# Patient Record
Sex: MALE | Race: WHITE | NOT HISPANIC OR LATINO | Employment: UNEMPLOYED | URBAN - METROPOLITAN AREA
[De-identification: names, ages, dates, MRNs, and addresses within clinical notes are randomized per-mention and may not be internally consistent; named-entity substitution may affect disease eponyms.]

---

## 2017-03-28 ENCOUNTER — COMMUNICATION - HEALTHEAST (OUTPATIENT)
Dept: FAMILY MEDICINE | Facility: CLINIC | Age: 62
End: 2017-03-28

## 2017-03-28 DIAGNOSIS — I10 ESSENTIAL HYPERTENSION: ICD-10-CM

## 2017-04-02 ENCOUNTER — COMMUNICATION - HEALTHEAST (OUTPATIENT)
Dept: FAMILY MEDICINE | Facility: CLINIC | Age: 62
End: 2017-04-02

## 2017-04-02 DIAGNOSIS — I10 ESSENTIAL HYPERTENSION: ICD-10-CM

## 2017-04-11 ENCOUNTER — OFFICE VISIT - HEALTHEAST (OUTPATIENT)
Dept: FAMILY MEDICINE | Facility: CLINIC | Age: 62
End: 2017-04-11

## 2017-04-11 DIAGNOSIS — S06.9X9S MAJOR NEUROCOGNITIVE DISORDER AS LATE EFFECT OF TRAUMATIC BRAIN INJURY WITH BEHAVIORAL DISTURBANCE (H): ICD-10-CM

## 2017-04-11 DIAGNOSIS — Z87.898 HX OF SYNCOPE: ICD-10-CM

## 2017-04-11 DIAGNOSIS — Z78.9 ALCOHOL USE: ICD-10-CM

## 2017-04-11 DIAGNOSIS — F33.1 MAJOR DEPRESSIVE DISORDER, RECURRENT EPISODE, MODERATE (H): ICD-10-CM

## 2017-04-11 DIAGNOSIS — B19.20 UNSPECIFIED VIRAL HEPATITIS C WITHOUT HEPATIC COMA: ICD-10-CM

## 2017-04-11 DIAGNOSIS — Z00.00 ROUTINE GENERAL MEDICAL EXAMINATION AT A HEALTH CARE FACILITY: ICD-10-CM

## 2017-04-11 DIAGNOSIS — F02.818 MAJOR NEUROCOGNITIVE DISORDER AS LATE EFFECT OF TRAUMATIC BRAIN INJURY WITH BEHAVIORAL DISTURBANCE (H): ICD-10-CM

## 2017-04-11 LAB
CHOLEST SERPL-MCNC: 254 MG/DL
FASTING STATUS PATIENT QL REPORTED: NO
HDLC SERPL-MCNC: 140 MG/DL
LDLC SERPL CALC-MCNC: 100 MG/DL
PSA SERPL-MCNC: 0.8 NG/ML (ref 0–3.5)
TRIGL SERPL-MCNC: 68 MG/DL

## 2017-04-11 ASSESSMENT — MIFFLIN-ST. JEOR: SCORE: 1712.98

## 2017-05-15 ENCOUNTER — RECORDS - HEALTHEAST (OUTPATIENT)
Dept: ADMINISTRATIVE | Facility: OTHER | Age: 62
End: 2017-05-15

## 2017-05-18 ENCOUNTER — COMMUNICATION - HEALTHEAST (OUTPATIENT)
Dept: FAMILY MEDICINE | Facility: CLINIC | Age: 62
End: 2017-05-18

## 2017-05-18 ENCOUNTER — COMMUNICATION - HEALTHEAST (OUTPATIENT)
Dept: NEUROLOGY | Facility: CLINIC | Age: 62
End: 2017-05-18

## 2017-05-18 DIAGNOSIS — I10 ESSENTIAL HYPERTENSION: ICD-10-CM

## 2017-05-18 DIAGNOSIS — F43.22 ADJUSTMENT DISORDER WITH ANXIOUS MOOD: ICD-10-CM

## 2018-06-08 ENCOUNTER — COMMUNICATION - HEALTHEAST (OUTPATIENT)
Dept: FAMILY MEDICINE | Facility: CLINIC | Age: 63
End: 2018-06-08

## 2018-06-08 DIAGNOSIS — I10 ESSENTIAL HYPERTENSION: ICD-10-CM

## 2018-06-13 ENCOUNTER — COMMUNICATION - HEALTHEAST (OUTPATIENT)
Dept: FAMILY MEDICINE | Facility: CLINIC | Age: 63
End: 2018-06-13

## 2018-06-13 DIAGNOSIS — I10 ESSENTIAL HYPERTENSION: ICD-10-CM

## 2018-06-16 ENCOUNTER — COMMUNICATION - HEALTHEAST (OUTPATIENT)
Dept: FAMILY MEDICINE | Facility: CLINIC | Age: 63
End: 2018-06-16

## 2018-06-16 DIAGNOSIS — I10 ESSENTIAL HYPERTENSION: ICD-10-CM

## 2018-11-23 ENCOUNTER — COMMUNICATION - HEALTHEAST (OUTPATIENT)
Dept: FAMILY MEDICINE | Facility: CLINIC | Age: 63
End: 2018-11-23

## 2018-11-23 DIAGNOSIS — I10 ESSENTIAL HYPERTENSION: ICD-10-CM

## 2018-11-27 ENCOUNTER — COMMUNICATION - HEALTHEAST (OUTPATIENT)
Dept: FAMILY MEDICINE | Facility: CLINIC | Age: 63
End: 2018-11-27

## 2018-11-27 DIAGNOSIS — I10 ESSENTIAL HYPERTENSION: ICD-10-CM

## 2018-12-02 ENCOUNTER — COMMUNICATION - HEALTHEAST (OUTPATIENT)
Dept: FAMILY MEDICINE | Facility: CLINIC | Age: 63
End: 2018-12-02

## 2018-12-02 DIAGNOSIS — I10 ESSENTIAL HYPERTENSION: ICD-10-CM

## 2019-01-24 ENCOUNTER — COMMUNICATION - HEALTHEAST (OUTPATIENT)
Dept: FAMILY MEDICINE | Facility: CLINIC | Age: 64
End: 2019-01-24

## 2019-02-13 ENCOUNTER — RECORDS - HEALTHEAST (OUTPATIENT)
Dept: ADMINISTRATIVE | Facility: OTHER | Age: 64
End: 2019-02-13

## 2019-02-18 ENCOUNTER — HOME CARE/HOSPICE - HEALTHEAST (OUTPATIENT)
Dept: HOME HEALTH SERVICES | Facility: HOME HEALTH | Age: 64
End: 2019-02-18

## 2019-02-21 ENCOUNTER — RECORDS - HEALTHEAST (OUTPATIENT)
Dept: ADMINISTRATIVE | Facility: OTHER | Age: 64
End: 2019-02-21

## 2019-02-27 ENCOUNTER — RECORDS - HEALTHEAST (OUTPATIENT)
Dept: LAB | Facility: CLINIC | Age: 64
End: 2019-02-27

## 2019-02-28 LAB
ALBUMIN SERPL-MCNC: 2.9 G/DL (ref 3.5–5)
ALP SERPL-CCNC: 60 U/L (ref 45–120)
ALT SERPL W P-5'-P-CCNC: 22 U/L (ref 0–45)
ANION GAP SERPL CALCULATED.3IONS-SCNC: 9 MMOL/L (ref 5–18)
AST SERPL W P-5'-P-CCNC: 24 U/L (ref 0–40)
BILIRUB DIRECT SERPL-MCNC: 0.2 MG/DL
BILIRUB SERPL-MCNC: 0.3 MG/DL (ref 0–1)
BUN SERPL-MCNC: 8 MG/DL (ref 8–22)
CALCIUM SERPL-MCNC: 9 MG/DL (ref 8.5–10.5)
CHLORIDE BLD-SCNC: 106 MMOL/L (ref 98–107)
CO2 SERPL-SCNC: 23 MMOL/L (ref 22–31)
CREAT SERPL-MCNC: 0.68 MG/DL (ref 0.7–1.3)
GFR SERPL CREATININE-BSD FRML MDRD: >60 ML/MIN/1.73M2
GLUCOSE BLD-MCNC: 86 MG/DL (ref 70–125)
POTASSIUM BLD-SCNC: 4 MMOL/L (ref 3.5–5)
PROT SERPL-MCNC: 6.9 G/DL (ref 6–8)
SODIUM SERPL-SCNC: 138 MMOL/L (ref 136–145)

## 2019-03-05 ENCOUNTER — RECORDS - HEALTHEAST (OUTPATIENT)
Dept: ADMINISTRATIVE | Facility: OTHER | Age: 64
End: 2019-03-05

## 2019-03-13 ENCOUNTER — RECORDS - HEALTHEAST (OUTPATIENT)
Dept: ADMINISTRATIVE | Facility: OTHER | Age: 64
End: 2019-03-13

## 2019-05-14 ENCOUNTER — COMMUNICATION - HEALTHEAST (OUTPATIENT)
Dept: FAMILY MEDICINE | Facility: CLINIC | Age: 64
End: 2019-05-14

## 2019-05-14 DIAGNOSIS — I10 ESSENTIAL HYPERTENSION: ICD-10-CM

## 2019-07-12 ENCOUNTER — RECORDS - HEALTHEAST (OUTPATIENT)
Dept: ADMINISTRATIVE | Facility: OTHER | Age: 64
End: 2019-07-12

## 2019-08-18 ENCOUNTER — RECORDS - HEALTHEAST (OUTPATIENT)
Dept: ADMINISTRATIVE | Facility: OTHER | Age: 64
End: 2019-08-18

## 2019-08-27 ENCOUNTER — OFFICE VISIT - HEALTHEAST (OUTPATIENT)
Dept: FAMILY MEDICINE | Facility: CLINIC | Age: 64
End: 2019-08-27

## 2019-08-27 DIAGNOSIS — F10.20 ALCOHOLISM (H): ICD-10-CM

## 2019-08-27 DIAGNOSIS — R56.9 ALCOHOL WITHDRAWAL SEIZURE WITHOUT COMPLICATION (H): ICD-10-CM

## 2019-08-27 DIAGNOSIS — S06.9X9S MAJOR NEUROCOGNITIVE DISORDER AS LATE EFFECT OF TRAUMATIC BRAIN INJURY WITH BEHAVIORAL DISTURBANCE (H): ICD-10-CM

## 2019-08-27 DIAGNOSIS — Z71.6 ENCOUNTER FOR TOBACCO USE CESSATION COUNSELING: ICD-10-CM

## 2019-08-27 DIAGNOSIS — Z09 HOSPITAL DISCHARGE FOLLOW-UP: ICD-10-CM

## 2019-08-27 DIAGNOSIS — R35.0 URINE FREQUENCY: ICD-10-CM

## 2019-08-27 DIAGNOSIS — F02.818 MAJOR NEUROCOGNITIVE DISORDER AS LATE EFFECT OF TRAUMATIC BRAIN INJURY WITH BEHAVIORAL DISTURBANCE (H): ICD-10-CM

## 2019-08-27 DIAGNOSIS — F10.930 ALCOHOL WITHDRAWAL SEIZURE WITHOUT COMPLICATION (H): ICD-10-CM

## 2019-08-27 LAB
ALBUMIN UR-MCNC: NEGATIVE MG/DL
APPEARANCE UR: CLEAR
BILIRUB UR QL STRIP: NEGATIVE
COLOR UR AUTO: YELLOW
GLUCOSE UR STRIP-MCNC: NEGATIVE MG/DL
HGB UR QL STRIP: NEGATIVE
KETONES UR STRIP-MCNC: NEGATIVE MG/DL
LEUKOCYTE ESTERASE UR QL STRIP: NEGATIVE
NITRATE UR QL: NEGATIVE
PH UR STRIP: 7 [PH] (ref 5–8)
SP GR UR STRIP: 1.01 (ref 1–1.03)
UROBILINOGEN UR STRIP-ACNC: NORMAL

## 2019-08-27 ASSESSMENT — MIFFLIN-ST. JEOR: SCORE: 1654.01

## 2019-08-28 LAB — BACTERIA SPEC CULT: NO GROWTH

## 2019-09-11 ENCOUNTER — OFFICE VISIT - HEALTHEAST (OUTPATIENT)
Dept: FAMILY MEDICINE | Facility: CLINIC | Age: 64
End: 2019-09-11

## 2019-09-11 DIAGNOSIS — F10.20 ALCOHOLISM (H): ICD-10-CM

## 2019-09-11 DIAGNOSIS — R56.9 ALCOHOL WITHDRAWAL SEIZURE WITHOUT COMPLICATION (H): ICD-10-CM

## 2019-09-11 DIAGNOSIS — F02.818 MAJOR NEUROCOGNITIVE DISORDER AS LATE EFFECT OF TRAUMATIC BRAIN INJURY WITH BEHAVIORAL DISTURBANCE (H): ICD-10-CM

## 2019-09-11 DIAGNOSIS — F10.930 ALCOHOL WITHDRAWAL SEIZURE WITHOUT COMPLICATION (H): ICD-10-CM

## 2019-09-11 DIAGNOSIS — R35.0 URINE FREQUENCY: ICD-10-CM

## 2019-09-11 DIAGNOSIS — S06.9X9S MAJOR NEUROCOGNITIVE DISORDER AS LATE EFFECT OF TRAUMATIC BRAIN INJURY WITH BEHAVIORAL DISTURBANCE (H): ICD-10-CM

## 2019-09-11 LAB — PSA SERPL-MCNC: 1 NG/ML (ref 0–4.5)

## 2019-09-11 RX ORDER — ACETAMINOPHEN 500 MG
500 TABLET ORAL
Status: SHIPPED | COMMUNITY
Start: 2019-08-18

## 2019-09-13 ENCOUNTER — COMMUNICATION - HEALTHEAST (OUTPATIENT)
Dept: FAMILY MEDICINE | Facility: CLINIC | Age: 64
End: 2019-09-13

## 2019-11-19 ENCOUNTER — COMMUNICATION - HEALTHEAST (OUTPATIENT)
Dept: FAMILY MEDICINE | Facility: CLINIC | Age: 64
End: 2019-11-19

## 2019-11-19 ENCOUNTER — OFFICE VISIT - HEALTHEAST (OUTPATIENT)
Dept: FAMILY MEDICINE | Facility: CLINIC | Age: 64
End: 2019-11-19

## 2019-11-19 DIAGNOSIS — F02.818 MAJOR NEUROCOGNITIVE DISORDER AS LATE EFFECT OF TRAUMATIC BRAIN INJURY WITH BEHAVIORAL DISTURBANCE (H): ICD-10-CM

## 2019-11-19 DIAGNOSIS — S06.9X9S MAJOR NEUROCOGNITIVE DISORDER AS LATE EFFECT OF TRAUMATIC BRAIN INJURY WITH BEHAVIORAL DISTURBANCE (H): ICD-10-CM

## 2019-11-19 DIAGNOSIS — F10.20 ALCOHOLISM (H): ICD-10-CM

## 2019-11-19 ASSESSMENT — MIFFLIN-ST. JEOR: SCORE: 1635.87

## 2019-11-19 ASSESSMENT — PATIENT HEALTH QUESTIONNAIRE - PHQ9: SUM OF ALL RESPONSES TO PHQ QUESTIONS 1-9: 1

## 2019-11-20 ENCOUNTER — COMMUNICATION - HEALTHEAST (OUTPATIENT)
Dept: SCHEDULING | Facility: CLINIC | Age: 64
End: 2019-11-20

## 2019-11-20 DIAGNOSIS — F10.20 ALCOHOLISM (H): ICD-10-CM

## 2019-11-20 DIAGNOSIS — S06.9X9S MAJOR NEUROCOGNITIVE DISORDER AS LATE EFFECT OF TRAUMATIC BRAIN INJURY WITH BEHAVIORAL DISTURBANCE (H): ICD-10-CM

## 2019-11-20 DIAGNOSIS — F02.818 MAJOR NEUROCOGNITIVE DISORDER AS LATE EFFECT OF TRAUMATIC BRAIN INJURY WITH BEHAVIORAL DISTURBANCE (H): ICD-10-CM

## 2020-03-17 ENCOUNTER — COMMUNICATION - HEALTHEAST (OUTPATIENT)
Dept: FAMILY MEDICINE | Facility: CLINIC | Age: 65
End: 2020-03-17

## 2020-03-17 DIAGNOSIS — S06.9X9S MAJOR NEUROCOGNITIVE DISORDER AS LATE EFFECT OF TRAUMATIC BRAIN INJURY WITH BEHAVIORAL DISTURBANCE (H): ICD-10-CM

## 2020-03-17 DIAGNOSIS — F02.818 MAJOR NEUROCOGNITIVE DISORDER AS LATE EFFECT OF TRAUMATIC BRAIN INJURY WITH BEHAVIORAL DISTURBANCE (H): ICD-10-CM

## 2020-03-17 DIAGNOSIS — F10.20 ALCOHOLISM (H): ICD-10-CM

## 2020-03-18 ENCOUNTER — COMMUNICATION - HEALTHEAST (OUTPATIENT)
Dept: FAMILY MEDICINE | Facility: CLINIC | Age: 65
End: 2020-03-18

## 2020-03-18 DIAGNOSIS — S06.9X9S MAJOR NEUROCOGNITIVE DISORDER AS LATE EFFECT OF TRAUMATIC BRAIN INJURY WITH BEHAVIORAL DISTURBANCE (H): ICD-10-CM

## 2020-03-18 DIAGNOSIS — F02.818 MAJOR NEUROCOGNITIVE DISORDER AS LATE EFFECT OF TRAUMATIC BRAIN INJURY WITH BEHAVIORAL DISTURBANCE (H): ICD-10-CM

## 2020-06-25 ENCOUNTER — OFFICE VISIT - HEALTHEAST (OUTPATIENT)
Dept: FAMILY MEDICINE | Facility: CLINIC | Age: 65
End: 2020-06-25

## 2020-06-25 DIAGNOSIS — F02.818 MAJOR NEUROCOGNITIVE DISORDER AS LATE EFFECT OF TRAUMATIC BRAIN INJURY WITH BEHAVIORAL DISTURBANCE (H): ICD-10-CM

## 2020-06-25 DIAGNOSIS — F10.20 ALCOHOLISM (H): ICD-10-CM

## 2020-06-25 DIAGNOSIS — S06.9X9S MAJOR NEUROCOGNITIVE DISORDER AS LATE EFFECT OF TRAUMATIC BRAIN INJURY WITH BEHAVIORAL DISTURBANCE (H): ICD-10-CM

## 2020-06-25 DIAGNOSIS — I10 ESSENTIAL HYPERTENSION: ICD-10-CM

## 2020-09-02 ENCOUNTER — RECORDS - HEALTHEAST (OUTPATIENT)
Dept: ADMINISTRATIVE | Facility: OTHER | Age: 65
End: 2020-09-02

## 2020-09-14 ENCOUNTER — OFFICE VISIT - HEALTHEAST (OUTPATIENT)
Dept: FAMILY MEDICINE | Facility: CLINIC | Age: 65
End: 2020-09-14

## 2020-09-14 ENCOUNTER — COMMUNICATION - HEALTHEAST (OUTPATIENT)
Dept: FAMILY MEDICINE | Facility: CLINIC | Age: 65
End: 2020-09-14

## 2020-09-14 DIAGNOSIS — F02.818 MAJOR NEUROCOGNITIVE DISORDER AS LATE EFFECT OF TRAUMATIC BRAIN INJURY WITH BEHAVIORAL DISTURBANCE (H): ICD-10-CM

## 2020-09-14 DIAGNOSIS — G40.909 SEIZURE DISORDER (H): ICD-10-CM

## 2020-09-14 DIAGNOSIS — S06.9X9S MAJOR NEUROCOGNITIVE DISORDER AS LATE EFFECT OF TRAUMATIC BRAIN INJURY WITH BEHAVIORAL DISTURBANCE (H): ICD-10-CM

## 2020-09-14 DIAGNOSIS — F10.20 ALCOHOLISM (H): ICD-10-CM

## 2020-09-14 DIAGNOSIS — Z09 HOSPITAL DISCHARGE FOLLOW-UP: ICD-10-CM

## 2020-09-14 DIAGNOSIS — I10 ESSENTIAL HYPERTENSION: ICD-10-CM

## 2020-09-14 DIAGNOSIS — I69.30 SEQUELA OF LACUNAR INFARCTION: ICD-10-CM

## 2020-09-21 ENCOUNTER — COMMUNICATION - HEALTHEAST (OUTPATIENT)
Dept: FAMILY MEDICINE | Facility: CLINIC | Age: 65
End: 2020-09-21

## 2020-11-02 ENCOUNTER — AMBULATORY - HEALTHEAST (OUTPATIENT)
Dept: FAMILY MEDICINE | Facility: CLINIC | Age: 65
End: 2020-11-02

## 2020-11-02 ENCOUNTER — COMMUNICATION - HEALTHEAST (OUTPATIENT)
Dept: FAMILY MEDICINE | Facility: CLINIC | Age: 65
End: 2020-11-02

## 2020-11-09 ENCOUNTER — COMMUNICATION - HEALTHEAST (OUTPATIENT)
Dept: FAMILY MEDICINE | Facility: CLINIC | Age: 65
End: 2020-11-09

## 2020-11-30 ENCOUNTER — COMMUNICATION - HEALTHEAST (OUTPATIENT)
Dept: FAMILY MEDICINE | Facility: CLINIC | Age: 65
End: 2020-11-30

## 2020-11-30 DIAGNOSIS — S06.9X9S MAJOR NEUROCOGNITIVE DISORDER AS LATE EFFECT OF TRAUMATIC BRAIN INJURY WITH BEHAVIORAL DISTURBANCE (H): ICD-10-CM

## 2020-11-30 DIAGNOSIS — F02.818 MAJOR NEUROCOGNITIVE DISORDER AS LATE EFFECT OF TRAUMATIC BRAIN INJURY WITH BEHAVIORAL DISTURBANCE (H): ICD-10-CM

## 2020-12-17 ENCOUNTER — OFFICE VISIT - HEALTHEAST (OUTPATIENT)
Dept: FAMILY MEDICINE | Facility: CLINIC | Age: 65
End: 2020-12-17

## 2020-12-17 ENCOUNTER — RECORDS - HEALTHEAST (OUTPATIENT)
Dept: GENERAL RADIOLOGY | Facility: CLINIC | Age: 65
End: 2020-12-17

## 2020-12-17 ENCOUNTER — RECORDS - HEALTHEAST (OUTPATIENT)
Dept: ADMINISTRATIVE | Facility: OTHER | Age: 65
End: 2020-12-17

## 2020-12-17 DIAGNOSIS — Z11.1 SCREENING FOR TUBERCULOSIS: ICD-10-CM

## 2020-12-17 DIAGNOSIS — F33.1 MAJOR DEPRESSIVE DISORDER, RECURRENT EPISODE, MODERATE (H): ICD-10-CM

## 2020-12-17 DIAGNOSIS — M54.2 NECK PAIN: ICD-10-CM

## 2020-12-17 DIAGNOSIS — M54.2 CERVICALGIA: ICD-10-CM

## 2020-12-17 DIAGNOSIS — Z00.00 VISIT FOR PREVENTIVE HEALTH EXAMINATION: ICD-10-CM

## 2020-12-17 DIAGNOSIS — B19.20 HEPATITIS C VIRUS INFECTION WITHOUT HEPATIC COMA, UNSPECIFIED CHRONICITY: ICD-10-CM

## 2020-12-17 DIAGNOSIS — I10 ESSENTIAL HYPERTENSION: ICD-10-CM

## 2020-12-18 ENCOUNTER — COMMUNICATION - HEALTHEAST (OUTPATIENT)
Dept: FAMILY MEDICINE | Facility: CLINIC | Age: 65
End: 2020-12-18

## 2020-12-21 ENCOUNTER — COMMUNICATION - HEALTHEAST (OUTPATIENT)
Dept: FAMILY MEDICINE | Facility: CLINIC | Age: 65
End: 2020-12-21

## 2020-12-21 DIAGNOSIS — S06.9X9S MAJOR NEUROCOGNITIVE DISORDER AS LATE EFFECT OF TRAUMATIC BRAIN INJURY WITH BEHAVIORAL DISTURBANCE (H): ICD-10-CM

## 2020-12-21 DIAGNOSIS — F02.818 MAJOR NEUROCOGNITIVE DISORDER AS LATE EFFECT OF TRAUMATIC BRAIN INJURY WITH BEHAVIORAL DISTURBANCE (H): ICD-10-CM

## 2020-12-21 LAB
HCV RNA SERPL NAA+PROBE-ACNC: NORMAL [IU]/ML
HCV RNA SERPL NAA+PROBE-LOG IU: NORMAL LOG IU/ML

## 2020-12-22 ENCOUNTER — AMBULATORY - HEALTHEAST (OUTPATIENT)
Dept: OTHER | Facility: CLINIC | Age: 65
End: 2020-12-22

## 2020-12-22 LAB
GAMMA INTERFERON BACKGROUND BLD IA-ACNC: 0.06 IU/ML
M TB IFN-G BLD-IMP: NEGATIVE
MITOGEN IGNF BCKGRD COR BLD-ACNC: -0.01 IU/ML
MITOGEN IGNF BCKGRD COR BLD-ACNC: 0 IU/ML
QTF INTERPRETATION: NORMAL
QTF MITOGEN - NIL: >10 IU/ML

## 2020-12-31 ENCOUNTER — OFFICE VISIT - HEALTHEAST (OUTPATIENT)
Dept: FAMILY MEDICINE | Facility: CLINIC | Age: 65
End: 2020-12-31

## 2020-12-31 DIAGNOSIS — G40.909 SEIZURE DISORDER (H): ICD-10-CM

## 2020-12-31 DIAGNOSIS — M54.2 NECK PAIN: ICD-10-CM

## 2020-12-31 DIAGNOSIS — I10 ESSENTIAL HYPERTENSION: ICD-10-CM

## 2020-12-31 DIAGNOSIS — S06.9X9S MAJOR NEUROCOGNITIVE DISORDER AS LATE EFFECT OF TRAUMATIC BRAIN INJURY WITH BEHAVIORAL DISTURBANCE (H): ICD-10-CM

## 2020-12-31 DIAGNOSIS — F10.20 ALCOHOLISM (H): ICD-10-CM

## 2020-12-31 DIAGNOSIS — F02.818 MAJOR NEUROCOGNITIVE DISORDER AS LATE EFFECT OF TRAUMATIC BRAIN INJURY WITH BEHAVIORAL DISTURBANCE (H): ICD-10-CM

## 2020-12-31 DIAGNOSIS — E53.8 VITAMIN B12 DEFICIENCY (NON ANEMIC): ICD-10-CM

## 2021-01-13 ENCOUNTER — COMMUNICATION - HEALTHEAST (OUTPATIENT)
Dept: FAMILY MEDICINE | Facility: CLINIC | Age: 66
End: 2021-01-13

## 2021-01-13 DIAGNOSIS — F02.818 MAJOR NEUROCOGNITIVE DISORDER AS LATE EFFECT OF TRAUMATIC BRAIN INJURY WITH BEHAVIORAL DISTURBANCE (H): ICD-10-CM

## 2021-01-13 DIAGNOSIS — S06.9X9S MAJOR NEUROCOGNITIVE DISORDER AS LATE EFFECT OF TRAUMATIC BRAIN INJURY WITH BEHAVIORAL DISTURBANCE (H): ICD-10-CM

## 2021-01-13 DIAGNOSIS — G40.909 SEIZURE DISORDER (H): ICD-10-CM

## 2021-02-11 ENCOUNTER — OFFICE VISIT - HEALTHEAST (OUTPATIENT)
Dept: FAMILY MEDICINE | Facility: CLINIC | Age: 66
End: 2021-02-11

## 2021-02-11 DIAGNOSIS — I10 ESSENTIAL HYPERTENSION: ICD-10-CM

## 2021-02-11 DIAGNOSIS — G40.909 SEIZURE DISORDER (H): ICD-10-CM

## 2021-02-11 DIAGNOSIS — F33.1 MAJOR DEPRESSIVE DISORDER, RECURRENT EPISODE, MODERATE (H): ICD-10-CM

## 2021-02-11 DIAGNOSIS — F10.20 ALCOHOLISM (H): ICD-10-CM

## 2021-02-11 DIAGNOSIS — S06.9X9S MAJOR NEUROCOGNITIVE DISORDER AS LATE EFFECT OF TRAUMATIC BRAIN INJURY WITH BEHAVIORAL DISTURBANCE (H): ICD-10-CM

## 2021-02-11 DIAGNOSIS — E53.8 VITAMIN B12 DEFICIENCY (NON ANEMIC): ICD-10-CM

## 2021-02-11 DIAGNOSIS — F02.818 MAJOR NEUROCOGNITIVE DISORDER AS LATE EFFECT OF TRAUMATIC BRAIN INJURY WITH BEHAVIORAL DISTURBANCE (H): ICD-10-CM

## 2021-03-19 ENCOUNTER — AMBULATORY - HEALTHEAST (OUTPATIENT)
Dept: NURSING | Facility: CLINIC | Age: 66
End: 2021-03-19

## 2021-03-25 ENCOUNTER — OFFICE VISIT - HEALTHEAST (OUTPATIENT)
Dept: FAMILY MEDICINE | Facility: CLINIC | Age: 66
End: 2021-03-25

## 2021-03-25 DIAGNOSIS — F10.20 ALCOHOLISM (H): ICD-10-CM

## 2021-03-25 DIAGNOSIS — E53.8 VITAMIN B12 DEFICIENCY (NON ANEMIC): ICD-10-CM

## 2021-03-25 DIAGNOSIS — F02.818 MAJOR NEUROCOGNITIVE DISORDER AS LATE EFFECT OF TRAUMATIC BRAIN INJURY WITH BEHAVIORAL DISTURBANCE (H): ICD-10-CM

## 2021-03-25 DIAGNOSIS — S06.9X9S MAJOR NEUROCOGNITIVE DISORDER AS LATE EFFECT OF TRAUMATIC BRAIN INJURY WITH BEHAVIORAL DISTURBANCE (H): ICD-10-CM

## 2021-03-25 DIAGNOSIS — F33.1 MAJOR DEPRESSIVE DISORDER, RECURRENT EPISODE, MODERATE (H): ICD-10-CM

## 2021-03-25 DIAGNOSIS — I10 ESSENTIAL HYPERTENSION: ICD-10-CM

## 2021-03-25 DIAGNOSIS — G40.909 SEIZURE DISORDER (H): ICD-10-CM

## 2021-03-25 ASSESSMENT — PATIENT HEALTH QUESTIONNAIRE - PHQ9: SUM OF ALL RESPONSES TO PHQ QUESTIONS 1-9: 0

## 2021-04-07 ENCOUNTER — COMMUNICATION - HEALTHEAST (OUTPATIENT)
Dept: FAMILY MEDICINE | Facility: CLINIC | Age: 66
End: 2021-04-07

## 2021-04-07 DIAGNOSIS — S06.9X9S MAJOR NEUROCOGNITIVE DISORDER AS LATE EFFECT OF TRAUMATIC BRAIN INJURY WITH BEHAVIORAL DISTURBANCE (H): ICD-10-CM

## 2021-04-07 DIAGNOSIS — F02.818 MAJOR NEUROCOGNITIVE DISORDER AS LATE EFFECT OF TRAUMATIC BRAIN INJURY WITH BEHAVIORAL DISTURBANCE (H): ICD-10-CM

## 2021-04-07 RX ORDER — QUETIAPINE FUMARATE 25 MG/1
TABLET, FILM COATED ORAL
Qty: 30 TABLET | Refills: 2 | Status: SHIPPED | OUTPATIENT
Start: 2021-04-07 | End: 2021-12-28

## 2021-04-09 ENCOUNTER — AMBULATORY - HEALTHEAST (OUTPATIENT)
Dept: NURSING | Facility: CLINIC | Age: 66
End: 2021-04-09

## 2021-04-12 ENCOUNTER — COMMUNICATION - HEALTHEAST (OUTPATIENT)
Dept: FAMILY MEDICINE | Facility: CLINIC | Age: 66
End: 2021-04-12

## 2021-04-12 DIAGNOSIS — G40.909 SEIZURE DISORDER (H): ICD-10-CM

## 2021-04-13 RX ORDER — LEVETIRACETAM 750 MG/1
TABLET ORAL
Qty: 180 TABLET | Refills: 3 | Status: SHIPPED | OUTPATIENT
Start: 2021-04-13 | End: 2022-04-07

## 2021-05-03 ENCOUNTER — COMMUNICATION - HEALTHEAST (OUTPATIENT)
Dept: FAMILY MEDICINE | Facility: CLINIC | Age: 66
End: 2021-05-03

## 2021-05-03 DIAGNOSIS — I10 ESSENTIAL HYPERTENSION: ICD-10-CM

## 2021-05-04 RX ORDER — LOSARTAN POTASSIUM 100 MG/1
TABLET ORAL
Qty: 30 TABLET | Refills: 2 | Status: SHIPPED | OUTPATIENT
Start: 2021-05-04 | End: 2021-07-28

## 2021-05-26 ASSESSMENT — PATIENT HEALTH QUESTIONNAIRE - PHQ9: SUM OF ALL RESPONSES TO PHQ QUESTIONS 1-9: 1

## 2021-05-27 VITALS — DIASTOLIC BLOOD PRESSURE: 88 MMHG | HEART RATE: 66 BPM | SYSTOLIC BLOOD PRESSURE: 135 MMHG

## 2021-05-27 ASSESSMENT — PATIENT HEALTH QUESTIONNAIRE - PHQ9: SUM OF ALL RESPONSES TO PHQ QUESTIONS 1-9: 0

## 2021-05-28 NOTE — TELEPHONE ENCOUNTER
RN cannot approve Refill Request    RN can NOT refill this medication PCP messaged that patient is overdue for Office Visit.       Rebeca Otero, [facility] Triage/Med Refill 5/15/2019    Requested Prescriptions   Pending Prescriptions Disp Refills     amLODIPine-benazepril (LOTREL 5-20) 5-20 mg per capsule [Pharmacy Med Name: AMLOD/BENAZEPRIL 5-20MG  CAP] 15 capsule 0     Sig: TAKE 1 CAPSULE BY MOUTH ONCE DAILY (NEED  TO  BE  SEEN  FOR  AN  OFFICE  VISIT  WITH  DR. GROSSMAN  BEFORE  MORE  REFILLS)       [facility] Inhib[facility] Refill Protocol Failed - 5/14/2019  6:31 PM        Failed - PCP or prescribing provider visit in past 12 months       Last office visit with prescriber/PCP: 3/23/2016 Janak Smith MD OR same dept: Visit date not found OR same specialty: 3/23/2016 Janak Smith MD  Last physical: 4/11/2017 Last MTM visit: Visit date not found   Next visit within 3 mo: Visit date not found  Next physical within 3 mo: Visit date not found  Prescriber OR PCP: Janak Smith MD  Last diagnosis associated with med order: 1. Essential hypertension  - amLODIPine-benazepril (LOTREL 5-20) 5-20 mg per capsule [Pharmacy Med Name: AMLOD/BENAZEPRIL 5-20MG  CAP]; TAKE 1 CAPSULE BY MOUTH ONCE DAILY (NEED  TO  BE  SEEN  FOR  AN  OFFICE  VISIT  WITH  DR. GROSSMAN  BEFORE  MORE  REFILLS)  Dispense: 15 capsule; Refill: 0    If protocol passes may refill for 12 months if within 3 months of last provider visit (or a total of 15 months).             Failed - Blood pressure filed in past 12 months     BP Readings from Last 1 Encounters:   04/11/17 138/80             Passed - Serum Potassium in past 12 months     Lab Results   Component Value Date    Potassium 4.0 02/28/2019             Passed - Serum Creatinine in past 12 months     Creatinine   Date Value Ref Range Status   02/28/2019 0.68 (L) 0.70 - 1.30 mg/dL Final

## 2021-05-30 VITALS — BODY MASS INDEX: 31.52 KG/M2 | HEIGHT: 68 IN | WEIGHT: 208 LBS

## 2021-05-31 NOTE — PATIENT INSTRUCTIONS - HE
psa level drawn  Will call with results    F/u with dr candelario in 2 weeks    If still smoking d/c the patches    Orders printed for seroquel  And naltrexone

## 2021-05-31 NOTE — PROGRESS NOTES
Subjective: Patient comes in for hospital discharge follow-up.  Patient was hospitalized from July 12 through August 18 at [facility].  He had a alcohol withdrawal seizure    He stayed in for alcohol treatment.    He has a history of anemia and thrombocytopenia.    History of traumatic brain injury from years ago.    Patient had a subdural hematoma in the past.    He continues on medications of naltrexone 50 mg daily as well as Seroquel 25 mg at bedtime.    He takes Tylenol as needed for pain    He is on some nicotine patches.  He states that he still smoking.  I told him he cannot be on the patch if he smokes.    His recheck blood pressure is 138/82 we will monitor that in the past he been on amlodipine/benazepril.  I can see him back for recheck in a couple weeks.    Patient was supposed to get a PSA level today I did check a UA UC.  He is been having some symptoms of urinary frequency and nocturia.    Patient now is staying at a sober living.  I do have the information from where he is staying, [facility] ,  is Nancy Sutherland, phone number [phone]    Fax number [phone]    Tobacco status: He  reports that he has been smoking.  He does not have any smokeless tobacco history on file.    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 30 tablet 1     QUEtiapine (SEROQUEL) 25 MG tablet Take 1 tablet (25 mg total) by mouth at bedtime. 30 tablet 1     No current facility-administered medications for this visit.        ROS:   10 point review of systems positive as outlined above otherwise negative  Objective:    /82   Pulse 84   Temp 98.4  F (36.9  C)   Resp 17   Ht 5' 8\" (1.727 m)   Wt 195 lb (88.5 kg)   BMI 29.65 kg/m    Body mass index is 29.65 kg/m .    General appearance no acute distress    Patient for the most part seems to have appropriate thought pattern.  He did not have good memory into earlier this year when he was at the hospital level.    And when I asked him what he does during the days he says he walks walks around a little bit just watches TV.    We discussed the importance of exercise.    He has not had any alcohol since before the admission to the hospital.    HEENT neck negative no adenopathy oropharynx is clear    Vital signs as outlined with recheck blood pressure 138/82    Lungs are clear no rales or rhonchi heart was regular S1-S2 rate in the 80 range.    Abdomen nontender no guarding rebound    Extremities without edema, skin was normal no rashes.    Urine was normal urine culture pending    PSA level was ordered but patient left without getting it done    Results for orders placed or performed in visit on 08/27/19   Urinalysis   Result Value Ref Range    Color, UA Yellow Colorless, Yellow, Straw, Light Yellow    Clarity, UA Clear Clear    Glucose, UA Negative Negative    Bilirubin, UA Negative Negative    Ketones, UA Negative Negative    Specific Gravity, UA 1.015 1.005 - 1.030    Blood, UA Negative Negative    pH, UA 7.0 5.0 - 8.0    Protein, UA Negative Negative mg/dL    Urobilinogen, UA 1.0 E.U./dL 0.2 E.U./dL, 1.0 E.U./dL    Nitrite, UA Negative Negative    Leukocytes, UA Negative Negative "       Assessment:  1. Hospital discharge follow-up     2. 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 Alcoholism (H)  naltrexone (DEPADE) 50 mg tablet    DISCONTINUED: naltrexone (DEPADE) 50 mg tablet    DISCONTINUED: naltrexone (DEPADE) 50 mg tablet   4. Alcohol withdrawal seizure without complication (H)     5. Urine frequency  Urinalysis    Culture, Urine    PSA, Annual Screen (Prostatic-Specific Antigen)    CANCELED: PSA, Annual Screen (Prostatic-Specific Antigen)   6. Encounter for tobacco use cessation counseling       Discharge follow-up he had an alcohol withdrawal seizure    He is in treatment now is in the hospital for over a month.    Patient has urinary frequency we will see him back for recheck a PSA to we will discuss rectal check again he did not want to get this done today.    Borderline blood pressure continue to monitor.    Tobacco abuse.  Can use the patch if he does not smoke otherwise he should use the patch.        Plan: As discussed above follow-up in a couple weeks consider Flomax.    I have not seen the patient in over 3 years.    This transcription uses voice recognition software, which may contain typographical errors.

## 2021-06-01 NOTE — TELEPHONE ENCOUNTER
[phone] phone number has been disconnected. Called [phone] phone number belongs to [facility]. No CTC sign. Are you okay if we send letter to home?

## 2021-06-01 NOTE — PROGRESS NOTES
Subjective: Patient comes in for follow-up he was here on 8/27/2019.  Continues on naltrexone and Seroquel I did refill those    He is in a group home will be there another 2-1/2 months he has a history of alcoholism he had a withdrawal seizure and was hospitalized prior to this please see discussion    Patient has had previous neurocognitive disorder from traumatic brain injury    Patient has frequent urine he had a normal blood sugar in July 2019.    I did check a PSA level today    Teeth are in poor repair and he will see a dentist.    Tobacco status: He  reports that he has been smoking cigarettes.  He has never used smokeless tobacco.    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       naltrexone (DEPADE) 50 mg tablet Take 1 tablet (50 mg total) by mouth daily. 30 tablet 3     QUEtiapine (SEROQUEL) 25 MG tablet Take 1 tablet (25 mg total) by mouth at bedtime. 30 tablet 3     No current facility-administered medications for this visit.        ROS:   Review of systems positive as outlined, 10 point, otherwise negative    Objective:    /80 (Patient Site: Right Arm, Patient Position: Sitting, Cuff Size: Adult Large)   Pulse 94   Resp 16   Wt 196 lb 12  oz (89.2 kg)   BMI 29.92 kg/m    Body mass index is 29.92 kg/m .      General appearance no acute distress, talkative    Vital signs are stable  HEENT: Oropharynx with what looks like some gum disease and missing some teeth.  Lungs clear no rales or rhonchi heart regular S1-S2    No abdominal pain guarding or rebound    Skin was normal no rashes.        Results for orders placed or performed in visit on 09/11/19   PSA, Annual Screen (Prostatic-Specific Antigen)   Result Value Ref Range    PSA 1.0 0.0 - 4.5 ng/mL       Assessment:  1. Alcoholism (H)  naltrexone (DEPADE) 50 mg tablet   2. Urine frequency  PSA, Annual Screen (Prostatic-Specific Antigen)   3. 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 Alcohol withdrawal seizure without complication (H)       History of heavy alcohol use now has been sober for a few months.    Urine frequency no diabetes normal PSA    History of traumatic brain injury as outlined above    Plan: He will be in a group home for another 2-1/2 months he states I will see him around that time for recheck    This transcription uses voice recognition software, which may contain typographical errors.

## 2021-06-01 NOTE — TELEPHONE ENCOUNTER
----- Message from Janak Smith MD sent at 9/12/2019  5:41 PM CDT -----  Please contact patient, let him know that the prostate blood test was normal, no worries

## 2021-06-03 VITALS
SYSTOLIC BLOOD PRESSURE: 124 MMHG | BODY MASS INDEX: 29.92 KG/M2 | DIASTOLIC BLOOD PRESSURE: 80 MMHG | RESPIRATION RATE: 16 BRPM | HEART RATE: 94 BPM | WEIGHT: 196.75 LBS

## 2021-06-03 VITALS — HEIGHT: 68 IN | BODY MASS INDEX: 29.55 KG/M2 | WEIGHT: 195 LBS

## 2021-06-03 NOTE — TELEPHONE ENCOUNTER
Who is calling:  Nikki  Reason for Call:  To change the pharmacy on file to [facility] on [address] in [place] - [facility] is no longer in network for their insurance. If there are any issues or disagreeing from patient, please call wife Nikki.  Date of last appointment with primary care: 9/11/19  Okay to leave a detailed message: Yes

## 2021-06-03 NOTE — TELEPHONE ENCOUNTER
Sister called requesting to have patients RXs from yesterday transferred to [facility] on [address] in [place]    She states that he is in a treatment center but took a bus to [facility] to pick them up but they aren't there.  RN noted that they were sent to [facility] so RN called [facility] and requested they transfer Naltrexone and Quetiapine to [facility] per patient request.       Jillian Chambers RN, [facility] Med Refill/Triage, 11/20/2019 2:03 PM

## 2021-06-03 NOTE — PROGRESS NOTES
ASSESSMENT/PLAN:  1. Alcoholism (H)  naltrexone (DEPADE) 50 mg tablet   2. 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  We have reviewed records available - patient is now in sober living/safe house.  He has been treated with Naltrexone and Quetiapine for several weeks - now is running out and needs refills.  This seems appropriate for treatment of this individual.  PHQ-9 Total Score: 1 (11/19/2019  3:43 PM)  We have discussed treatment plan - patient is agreeable - will continue to follow with mental health therapist as well.      Return in about 3 months (around 2/19/2020) for Recheck.      Medications Discontinued During This Encounter   Medication Reason     naltrexone (DEPADE) 50 mg tablet Reorder     QUEtiapine (SEROQUEL) 25 MG tablet Reorder     There are no Patient Instructions on file for this visit.    Chief Complaint:  Chief Complaint   Patient presents with     Hospital Visit Follow Up     need medication prescription       HPI:   Binu Pineda is a 64 y.o. 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 No pertinent surgical history.  Social History     Socioeconomic History     Marital status: Single     Spouse name: Not on file     Number of children: Not on file     Years of education: Not on file     Highest education level: Not on file   Occupational History     Not on file   Social Needs     Financial resource strain: Not on file     Food insecurity:     Worry: Not on file     Inability: Not on file     Transportation needs:     Medical: Not on file     Non-medical: Not on file   Tobacco Use     Smoking status: Current Every Day Smoker     Types: Cigarettes     Smokeless tobacco: Never Used   Substance and Sexual Activity     Alcohol use: Not on file     Drug use: Not on file     Sexual activity: Not on file   Lifestyle     Physical activity:     Days per week: Not on file     Minutes per session: Not on file     Stress: Not on file   Relationships     Social connections:     Talks on phone: Not on file     Gets together: Not on file     Attends Sikh service: Not on file     Active member of club or organization: Not on file     Attends meetings of clubs or organizations: Not on file     Relationship status: Not on file     Intimate partner violence:     Fear of current or ex partner: Not on file     Emotionally abused: Not on file     Physically abused: Not on file     Forced sexual activity: Not on file   Other Topics Concern     Not on file   Social History Narrative     Not on file     History reviewed. No pertinent family history.    Meds:    Current Outpatient Medications:      acetaminophen (TYLENOL) 500 MG tablet, Take 500 mg by mouth., Disp: , Rfl:      naltrexone (DEPADE) 50 mg tablet, Take 1 tablet (50 mg total) by mouth daily., Disp: 30 tablet, Rfl: 3     QUEtiapine  "(SEROQUEL) 25 MG tablet, Take 1 tablet (25 mg total) by mouth at bedtime., Disp: 30 tablet, Rfl: 3    Allergies:  No Known Allergies    ROS:  Pertinent positives as noted in HPI; otherwise 12 point ROS negative.      Physical Exam:  EXAM:  /74 (Patient Site: Left Arm, Patient Position: Sitting, Cuff Size: Adult Regular)   Pulse 88   Temp 98.1  F (36.7  C) (Oral)   Ht 5' 8\" (1.727 m)   Wt 191 lb (86.6 kg)   BMI 29.04 kg/m     Gen:  NAD, appears well, well-hydrated, vague historian  HEENT:  TMs nl, oropharynx benign, nasal mucosa nl, conjunctiva clear  Neck:  Supple, no adenopathy, no thyromegaly, no carotid bruits, no JVD  Lungs:  Clear to auscultation bilaterally  Cor:  RRR no murmur  Abd:  Soft, nontender, BS+, no masses, no guarding or rebound, no HSM  Extr:  Neg.  Neuro:  No asymmetry  Skin:  Warm/dry        Results:  Results for orders placed or performed in visit on 09/11/19   PSA, Annual Screen (Prostatic-Specific Antigen)   Result Value Ref Range    PSA 1.0 0.0 - 4.5 ng/mL             "

## 2021-06-03 NOTE — TELEPHONE ENCOUNTER
Change the Pharmacy to [facility] on [address] in [place]

## 2021-06-04 VITALS
TEMPERATURE: 98.1 F | DIASTOLIC BLOOD PRESSURE: 74 MMHG | BODY MASS INDEX: 28.95 KG/M2 | HEART RATE: 88 BPM | HEIGHT: 68 IN | WEIGHT: 191 LBS | SYSTOLIC BLOOD PRESSURE: 136 MMHG

## 2021-06-05 ENCOUNTER — RECORDS - HEALTHEAST (OUTPATIENT)
Dept: FAMILY MEDICINE | Facility: CLINIC | Age: 66
End: 2021-06-05

## 2021-06-05 VITALS
SYSTOLIC BLOOD PRESSURE: 175 MMHG | WEIGHT: 206 LBS | BODY MASS INDEX: 31.32 KG/M2 | DIASTOLIC BLOOD PRESSURE: 80 MMHG | RESPIRATION RATE: 16 BRPM | OXYGEN SATURATION: 99 % | HEART RATE: 64 BPM

## 2021-06-05 DIAGNOSIS — S06.9XAA: ICD-10-CM

## 2021-06-06 NOTE — TELEPHONE ENCOUNTER
Per patient order   Want a new medication for snoring and muscle relaxer that he got from [facility].       Please advise

## 2021-06-06 NOTE — TELEPHONE ENCOUNTER
Called and left a detail msg that he does not need a telephone visit per Dr. Smith.    Med has be refill.

## 2021-06-06 NOTE — TELEPHONE ENCOUNTER
Have him find out what the medication from [facility] was.    There is no medication for snoring.  He would need to see a ear nose and throat doctor.  I would advise that at this time because of the coronavirus

## 2021-06-06 NOTE — TELEPHONE ENCOUNTER
PATIENT NEEDS QUERIQPINE AND NALTREXONE REFILLED .  ALSO A NEW MEDICATION FOR SNORING AND A MUSCLE RELAXER THAT HE GOT FROM [facility] . IF DR CAN REFILL THIS HE COULD CANCEL THE TELEPHONE VISIT CAN THIS BE DONE???

## 2021-06-09 NOTE — PROGRESS NOTES
"Binu Pineda is a 65 y.o. male who is being evaluated via a billable telephone visit.      The patient has been notified of following:     \"This telephone visit will be conducted via a call between you and your physician/provider. We have found that certain health care needs can be provided without the need for a physical exam.  This service lets us provide the care you need with a short phone conversation.  If a prescription is necessary we can send it directly to your pharmacy.  If lab work is needed we can place an order for that and you can then stop by our lab to have the test done at a later time.    Telephone visits are billed at different rates depending on your insurance coverage. During this emergency period, for some insurers they may be billed the same as an in-person visit.  Please reach out to your insurance provider with any questions.    If during the course of the call the physician/provider feels a telephone visit is not appropriate, you will not be charged for this service.\"    Patient has given verbal consent to a Telephone visit? Yes    What phone number would you like to be contacted at? [phone]         Additional provider notes:     Subjective: This patient had a virtual visit, telephone, due to the coronavirus pandemic.    Patient at evaluation back in September he is on naltrexone as well as Seroquel    Is on some Seroquel at bedtime he has had neurocognitive disorder secondary traumatic brain injury.    He actually is doing quite well now that he is quit drinking.  He is presently in a sober house.  He goes to AA meetings regularly.    Last alcohol drink was on 7/11/2019.    Patient has a history of some high blood pressure but most recently normal he is not on any medication we will continue to monitor that.    Patient needed a refill on his naltrexone and Seroquel.    This was done.    We talked about a physical exam in the next 6 months.        Tobacco status: He  reports that he " has been smoking cigarettes. He has never used smokeless tobacco.    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       naltrexone (DEPADE) 50 mg tablet Take 1 tablet (50 mg total) by mouth daily. 30 tablet 5     QUEtiapine (SEROQUEL) 25 MG tablet Take 1 tablet (25 mg total) by mouth at bedtime. 30 tablet 5     No current facility-administered medications for this visit.        ROS:   10 point review of systems positive as outlined above otherwise negative    Objective:    There were no vitals taken for this visit.  There is no height or weight on file to calculate BMI.      This was a virtual telephone exam there is no visual exam.    Patient denies any swollen glands or sore throat or nasal drainage.    Denies any headaches.    Lungs: Nonlabored breathing when he talks.  No wheezing by history    Heart: Normal history of any palpitations or rapid heartbeat.    He denies any abdominal pain    Denies any extremity swelling.    No rashes.    Results for orders placed or performed in visit on 09/11/19   PSA, Annual Screen (Prostatic-Specific Antigen)   Result Value Ref Range    PSA 1.0 0.0 - 4.5 ng/mL       Assessment:  1. Major neurocognitive disorder as late  effect of traumatic brain injury with behavioral disturbance (H)  QUEtiapine (SEROQUEL) 25 MG tablet   2. Alcoholism (H)  naltrexone (DEPADE) 50 mg tablet   3. Essential hypertension       History of major neurocognitive disorder secondary to his traumatic brain injury.    Behaviorally he is stable.  Continue Seroquel low-dose.    He seems to be doing much better now that he is quit drinking he has been dry for about 1 year.    Continue naltrexone    History of hypertension in the past presently stable off medications monitor        Plan: Plan for physical in 6 months    This transcription uses voice recognition software, which may contain typographical errors.      Phone call duration:  11 minutes, from 10:42 AM through 10:53 AM    Janak Smith MD
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
## 2021-06-11 NOTE — TELEPHONE ENCOUNTER
Left message #2 at [phone]. Sending letter out and postponing task out to 2 weeks and will try again if an appointment hasn't been made.
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
## 2021-06-11 NOTE — TELEPHONE ENCOUNTER
Left message for the pt to call back #1. Ok to relay message upon returned call.    Per Dr. Smith asked to call the pt to schedule a VV appt with him for a  follow up in 6 weeks.    Please try again later. Thank you.

## 2021-06-12 NOTE — TELEPHONE ENCOUNTER
Called and left message#1 for patient to return call to clinic to schedule a virtual visit for completion of form received by Dr. Smith. Form is at Dr. Smith's outbox.

## 2021-06-12 NOTE — TELEPHONE ENCOUNTER
Left message #2 at [phone]. Sending letter out and postponing task out to 2 weeks and will try again if an appointment hasn't been made.

## 2021-06-13 NOTE — TELEPHONE ENCOUNTER
Left message #3 at [phone]. We have made several attempts to contact patient by phone and letter to schedule an appointment. Unfortunately, our calls have not been returned and we were unable to schedule. At this time, we will no longer make an attempt to schedule this appointment. Completing task.
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
## 2021-06-13 NOTE — TELEPHONE ENCOUNTER
RN cannot approve Refill Request    RN can NOT refill this medication med is not covered by policy/route to provider. Last office visit: 9/11/2019 Janak Smith MD Last Physical: 4/11/2017 Last MTM visit: Visit date not found Last visit same specialty: 11/19/2019 Gayla Cruz MD.  Next visit within 3 mo: Visit date not found  Next physical within 3 mo: Visit date not found      Rebeca Otero, [facility] [place] Connection Triage/Med Refill 12/1/2020    Requested Prescriptions   Pending Prescriptions Disp Refills     QUEtiapine (SEROQUEL) 25 MG tablet [Pharmacy Med Name: QUETIAPINE FUMARATE 25MG TAB] 30 tablet 5     Sig: TAKE 1 TABLET BY MOUTH EVERY NIGHT AT BEDTIME       There is no refill protocol information for this order

## 2021-06-14 NOTE — PROGRESS NOTES
"Binu Pineda is a 65 y.o. male who is being evaluated via a billable video visit.      The patient has been notified of following:     \"This video visit will be conducted via a call between you and your physician/provider. We have found that certain health care needs can be provided without the need for an in-person physical exam.  This service lets us provide the care you need with a video conversation.  If a prescription is necessary we can send it directly to your pharmacy.  If lab work is needed we can place an order for that and you can then stop by our lab to have the test done at a later time.    Video visits are billed at different rates depending on your insurance coverage. Please reach out to your insurance provider with any questions.    If during the course of the call the physician/provider feels a video visit is not appropriate, you will not be charged for this service.\"    Patient has given verbal consent to a Video visit? Yes  How would you like to obtain your AVS? AVS Preference: Mail a copy.  If dropped by the video visit, the video invitation should be sent to: Text to cell phone: [phone]  Will anyone else be joining your video visit? No        Video Start Time: 10;34 am    Additional provider notes:     Subjective: This patient had a virtual visit.  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  Also has had seizures.  He does see neurology, Dr. Gely Galdamez.    I reviewed evaluation from this fall.    Patient did see Dr. Juan emery on 12/17/2020 additionally reviewed that    Patient had some x-rays done regarding his neck pain there was no fracture he had had a fall.    He is now 18 months sober.    Blood pressures been elevated various times in the last month at the group home 130-160/80-99.    Today was a little better at 135/88 pulse is in the 60s.    After discussion elected to place the patient on losartan 50 mg 1 a day    He needs follow-up in 6 " weeks we will recheck blood pressure check B12 level check creatinine potassium    Labs from 11/27/2020 showed normal Keppra level B1 level normal CBC normal BMP normal    The B12 level was a slightly at the low end of normal at 236 patient is now on B12 thousand daily.    No additional concern or issue    Patient did have a hepatitis C which was negative and a QuantiFERON gold which was negative with the last labs in December    Tobacco status: He  reports that he has been smoking cigarettes. He has never used smokeless tobacco.    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       aspirin 81 MG EC tablet Take 1 tablet (81 mg total) by mouth daily. 90 tablet 3     cyclobenzaprine (FLEXERIL) 5 MG tablet Take 1 tablet (5 mg total) by mouth every 8 (eight) hours as needed for muscle spasms. 20 tablet 1     levETIRAcetam (KEPPRA) 750 MG tablet Take 1 tablet (750 mg total) by mouth 2 (two) times a day. 60 tablet 1     naltrexone (DEPADE) 50 mg tablet Take 1 tablet (50 mg total) by mouth daily. 30 tablet 5     QUEtiapine (SEROQUEL) 25 MG tablet TAKE 1 TABLET BY MOUTH EVERY NIGHT AT BEDTIME 30 tablet 0     losartan (COZAAR) 50 MG tablet Take 1  tablet (50 mg total) by mouth daily. 90 tablet 0     No current facility-administered medications for this visit.        ROS:   10 point review of systems positive as outlined above otherwise negative.  Patient denies any COVID-19 symptoms or exposure    Objective:    /88 Comment: nurse reported  Pulse 66   There is no height or weight on file to calculate BMI.      General appearance no acute distress    HEENT: No scleral icterus denies any sore throat or neck pain anteriorly does have some posterior achiness from the fall but that is improved no radicular findings    X-ray was reviewed regarding the degenerative changes in cervical spine but no fractures.    Skin is normal no rashes    Lungs: Nonlabored breathing no wheezing.    Heart: No palpitations or rapid heart rate     he denies any focal weakness or numbness    No recent seizures    He denies any real change in cognitive/memory issues.    Results for orders placed or performed in visit on 12/17/20   QTF-Mycobacterium tuberculosis by QuantiFERON-TB Gold Plus   Result Value Ref Range    QTF RESULT Negative Negative    QTF INTREPRETATION       No interferon-gamma response to M. tuberculosis antigens was detected.  Infecton with M. tuberculosis is unlikely.  A negative result alone does not exclude infection with M. tuberculosis    QTF NIL 0.06 IU/mL    QTF ANTIGEN TB1-NIL -0.01 IU/mL    QTF ANTIGEN TB2 - NIL 0.00 IU/mL    QTF MITOGEN-NIL >10.00 IU/mL   Hepatitis C RNA Quantitation by RT-PCR   Result Value Ref Range    HCV RNA Quant HCV RNA Not Detected HCVND [IU]/mL    Log of HCV RNA Qt Not Calculated <1.2 Log IU/mL       Assessment:  1. Seizure disorder (H)     2. Essential hypertension  losartan (COZAAR) 50 MG tablet    DISCONTINUED: losartan (COZAAR) 50 MG tablet   3. 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 Alcoholism (H)     5. Vitamin B12 deficiency (non anemic)     6. Neck pain        Hypertension, will start losartan 50 mg a day.    B12 deficiency, stay on B12 1000 daily    Traumatic brain injury with neurocognitive disorder and seizure history presently stable continue Keppra    Alcoholism is now been sober for 18 months.    Recent fall with x-rays and evaluation clinically improved x-rays show some arthritis through the neck    Plan: As outlined above.  Start the losartan check creatinine potassium in 6 weeks    We will also check blood pressure and check B12 level.  He will have a virtual visit prior to    This transcription uses voice recognition software, which may contain typographical errors.      Video-Visit Details    Type of service:  Video Visit    Video End Time (time video stopped): 10:50 AM  Originating Location (pt. Location): Home    Distant Location (provider location):  [facility]     Platform used for Video Visit: Avis Smith MD

## 2021-06-15 NOTE — PROGRESS NOTES
Binu Pineda is a 65 y.o. male who is being evaluated via a billable video visit.      How would you like to obtain your AVS? Mail a copy.  If dropped from the video visit, the video invitation should be resent by: Text to cell phone: [phone]   Will anyone else be joining your video visit? No      Video Start Time: 10:49 AM    Assessment & Plan     Binu was seen today for follow-up.    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  Will increase to 100 mg daily recheck in 8 weeks.  Check labs after with B12 creatinine potassium.    Seizure disorder stable on Keppra    Major depression just takes Seroquel at bedtime seems to be stable    B12 deficiency on 1000 mcg daily check B12 level in a couple months.    Alcoholism no longer on naltrexone has been sober for 21 months.    Major neurocognitive disorder relatively stable, had previous traumatic brain injury.  Continues to see neurology.    Neck strain resolved          Review of external notes as documented in note, neurology consult from November 2020 reviewed    Independent review of unique test: Lab work from 11/27/2020 from [facility]    Independent historian: Caregiver at group home      25 minutes spent on the date of the encounter doing chart review, history and exam, documentation and further activities as noted above  :[id number]}     Tobacco Cessation:   reports that he has been smoking cigarettes. He has never used smokeless tobacco.    No follow-ups on file.    Janak Smith MD  [facility]    Subjective   Binu Pineda is 65 y.o. and presents today for the following health issues   HPI   This patient  had a virtual visit, video, due to the coronavirus pandemic.    This patient has history of hypertension.  He got started on losartan 50 mg a day his pressures mainly in the 130s but occasionally will go into the 140s today.  I increased his losartan from 50 up to 100 mg/day    Patient sees neurology.  I reviewed neurology notes from 11/19/2020 from [facility].  Also reviewed labs from 11/27/2020 CBC was normal but B12 had increased from 236 up to 300s we will recheck that down the line    BMP was normal Keppra was 9.8    Has had no seizures.    Patient has a history of alcoholism he is no longer on naltrexone he has been sober for about 21 months.    Continue Seroquel at bedtime.  He is on an aspirin Keppra Seroquel.    No additional concerns right now he was present as well as a group home care attendant.  He has neurocognitive disorder secondary to traumatic brain injury.    His depression has been stable.  He just takes the Seroquel at bedtime    No behavioral issues no hallucinations.    Patient had neck strain symptoms and was on Flexeril he is now done with that as well.    He denies any COVID-19 symptoms or exposures.        Review of Systems  10 point review of systems positive as outlined above otherwise negative      Objective       Vitals:  No vitals were obtained today due to virtual visit.    Physical Exam  General appearance no acute distress    HEENT no nasal drainage no cough no sore throat    No focal weakness or numbness.    Lungs: Nonlabored breathing no wheezing.    Heart: No palpitations or rapid heart rate    Extremities without edema.    Skin was normal no rashes    We will follow up again in 6 to 8 weeks following that we will check blood pressure, labs with B12 level creatinine and potassium        Video-Visit Details    Type of service:  Video Visit    Video End Time (time video stopped): 11:05 AM  Originating Location (pt. Location): Home    Distant Location (provider location):    [facility]     Platform used for Video Visit: Cooper

## 2021-06-16 PROBLEM — G40.909 SEIZURE DISORDER (H): Status: ACTIVE | Noted: 2020-12-31

## 2021-06-16 PROBLEM — F02.818 MAJOR NEUROCOGNITIVE DISORDER AS LATE EFFECT OF TRAUMATIC BRAIN INJURY WITH BEHAVIORAL DISTURBANCE (H): Status: ACTIVE | Noted: 2021-02-11

## 2021-06-16 PROBLEM — F10.20 ALCOHOLISM (H): Status: ACTIVE | Noted: 2019-08-27

## 2021-06-16 PROBLEM — S06.9X9S MAJOR NEUROCOGNITIVE DISORDER AS LATE EFFECT OF TRAUMATIC BRAIN INJURY WITH BEHAVIORAL DISTURBANCE (H): Status: ACTIVE | Noted: 2021-02-11

## 2021-06-16 PROBLEM — E53.8 VITAMIN B12 DEFICIENCY (NON ANEMIC): Status: ACTIVE | Noted: 2020-12-31

## 2021-06-16 NOTE — TELEPHONE ENCOUNTER
Refill Approved    Rx renewed per Medication Renewal Policy. Medication was last renewed on 1/13/2021 with 2 refills.  Last office visit: 3/25/2021 Virtual visit with PCP Dr DIONI Smith .    Marline Gutiérrez, [facility] Triage/Med Refill 4/13/2021     Requested Prescriptions   Pending Prescriptions Disp Refills     levETIRAcetam (KEPPRA) 750 MG tablet [Pharmacy Med Name: levETIRAcetam 750MG TABS] 60 tablet 2     Sig: TAKE 1 TABLET BY MOUTH TWICE A DAY       Gabapentin/Levetiracetam/Tiagabine Refill Protocol  Passed - 4/12/2021 11:05 AM        Passed - PCP or prescribing provider visit in past 12 months or next 3 months     Last office visit with prescriber/PCP: 9/11/2019 Janak Smith MD OR same dept: 12/17/2020 Trisha Mina MD OR same specialty: 12/17/2020 Trisha Mina MD  Last physical: 4/11/2017 Last MTM visit: Visit date not found   Next visit within 3 mo: Visit date not found  Next physical within 3 mo: Visit date not found  Prescriber OR PCP: Janak Smith MD  Last diagnosis associated with med order: 1. Seizure disorder (H)  - levETIRAcetam (KEPPRA) 750 MG tablet [Pharmacy Med Name: levETIRAcetam 750MG TABS]; TAKE 1 TABLET BY MOUTH TWICE A DAY  Dispense: 60 tablet; Refill: 2    If protocol passes may refill for 12 months if within 3 months of last provider visit (or a total of 15 months).

## 2021-06-16 NOTE — PROGRESS NOTES
Biun Pineda is a 65 y.o. male who is being evaluated via a billable video visit.      How would you like to obtain your AVS? Mail a copy.  If dropped from the video visit, the video invitation should be resent by: Text to cell phone: [phone]   Will anyone else be joining your video visit? No      Video Start Time: 10:20 am    Assessment & Plan     Binu was seen today for follow-up.    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  He has been relatively stable since he quit drinking alcohol now almost 2 years.  Continues in a group home    He continues on B12 we will check a B12 level and a CBC level.    He is on Keppra for seizure precautions and will check a Keppra level when he comes in as well.    I reviewed his cares with the caregiver from the group home.    We will plan to check blood pressure when he comes in.  He is on losartan daily.    Plan to see him for a physical in about 3 months this summer.    He will be contacted with results of his blood tests    He will get his second Covid shot on April 9          I reviewed the neurology consult from 11/19/2020.  Review of external notes as documented in note  Review of the result(s) of each unique test - Review of lab results including Keppra level from November, also B12 which was on the low end of normal  Assessment requiring an independent historian(s) - senior care staff  Diagnosis or treatment significantly limited by social determinants of health - Low socioeconomic status      20 minutes  spent on the date of the encounter doing chart review, history and exam, documentation and further activities as noted above       Tobacco Cessation:   reports that he has been smoking cigarettes. He has never used smokeless tobacco.    Return in about 3 months (around 6/25/2021) for Annual physical.    Janak Smith MD  [facility]    Subjective   Binu Pineda is 65 y.o. and presents today for the following health issues   HPI     This patient had a virtual visit, video, due to the coronavirus pandemic.    Patient has underlying traumatic brain injury he has seizure risk.  He has had no seizures he is on Keppra.  He last saw a neurologist 11/19/2020 , from [facility].    Patient takes Keppra has not had any seizures    Also has B12 deficiency and is on supplementation    We will check Keppra CBC and a B12 level.    There was independent history from caregiver at the group home patient is doing well he is not drinking    He has not drank now for almost 2 years.    He is off naltrexone.    He feels like his depression symptoms are minimal he continues on Seroquel at bedtime as his only medicine    Her blood pressure is on losartan 100 mg a day will check blood pressure as well as the labs when he comes in    We will plan to see him back for a physical face-to-face this summer.    No COVID-19 symptoms or exposure.    He is exercising regularly    He has had his first Covid vaccination last week second Covid vaccination on 4/9/2021    Review of Systems  10 point review of systems positive as outlined above otherwise negative      Objective       Vitals:  No vitals were obtained today due to virtual visit.    Physical Exam  General appearance no acute distress    HEENT no nasal congestion or sore throat or cough no neck pain    Lungs: Nonlabored breathing no wheezing.    Heart: No palpitations or rapid heart rate    Extremities no edema.    He has normal  sensation in through his feet.    Abdomen nontender normal bowels.            Video-Visit Details    Type of service:  Video Visit    Video End Time (time video stopped): 10:32 AM  Originating Location (pt. Location): Home    Distant Location (provider location):  [facility]     Platform used for Video Visit: Cooper

## 2021-06-16 NOTE — TELEPHONE ENCOUNTER
RN cannot approve Refill Request    RN can NOT refill this medication med is not covered by policy/route to provider. Last office visit: 9/11/2019 Janak Smith MD Last Physical: 4/11/2017 Last MTM visit: Visit date not found Last visit same specialty: 12/17/2020 Trisha Mina MD.  Next visit within 3 mo: Visit date not found  Next physical within 3 mo: Visit date not found      Ghislaine Sibley, [facility] Triage/Med Refill 4/7/2021    Requested Prescriptions   Pending Prescriptions Disp Refills     QUEtiapine (SEROQUEL) 25 MG tablet [Pharmacy Med Name: QUEtiapine Fumarate 25MG TABS] 30 tablet 2     Sig: TAKE 1 TABLET BY MOUTH EVERY NIGHT AT BEDTIME       There is no refill protocol information for this order

## 2021-06-17 NOTE — TELEPHONE ENCOUNTER
RN cannot approve Refill Request    RN can NOT refill this medication PCP messaged that patient is overdue for Labs. Last office visit: 9/11/2019 Janak Smith MD Last Physical: 4/11/2017 Last MTM visit: Visit date not found Last visit same specialty: 12/17/2020 Trisha Mina MD.  Next visit within 3 mo: Visit date not found  Next physical within 3 mo: Visit date not found      Hattie Aguayo, [facility] Triage/Med Refill 5/3/2021    Requested Prescriptions   Pending Prescriptions Disp Refills     losartan (COZAAR) 100 MG tablet [Pharmacy Med Name: Losartan Potassium 100MG TABS] 30 tablet 2     Sig: TAKE 1 TABLET BY MOUTH DAILY       Angiotensin Receptor Blocker Protocol Failed - 5/3/2021 12:38 PM        Failed - Serum potassium within the past 12 months     No results found for: LN-POTASSIUM          Failed - Serum creatinine within the past 12 months     Creatinine   Date Value Ref Range Status   02/28/2019 0.68 (L) 0.70 - 1.30 mg/dL Final             Passed - PCP or prescribing provider visit in past 12 months       Last office visit with prescriber/PCP: 9/11/2019 Janak Smith MD OR same dept: 12/17/2020 Trisha Mina MD OR same specialty: 12/17/2020 Trisha Mina MD  Last physical: 4/11/2017 Last MTM visit: Visit date not found   Next visit within 3 mo: Visit date not found  Next physical within 3 mo: Visit date not found  Prescriber OR PCP: Janak Smith MD  Last diagnosis associated with med order: 1. Essential hypertension  - losartan (COZAAR) 100 MG tablet [Pharmacy Med Name: Losartan Potassium 100MG TABS]; TAKE 1 TABLET BY MOUTH DAILY  Dispense: 30 tablet; Refill: 2    If protocol passes may refill for 12 months if within 3 months of last provider visit (or a total of 15 months).             Passed - Blood pressure filed in past 12 months     BP Readings from Last 1 Encounters:   12/31/20 135/88

## 2021-06-19 NOTE — LETTER
Letter by Janak Smith MD at      Author: Janak Smith MD Service: -- Author Type: --    Filed:  Encounter Date: 9/13/2019 Status: (Other)         Binu Pineda  [address] [facility] [address]             September 16, 2019        Dear Mr. Pineda,    Below are the results from your recent visit:    Resulted Orders   PSA, Annual Screen (Prostatic-Specific Antigen)   Result Value Ref Range    PSA 1.0 0.0 - 4.5 ng/mL    Narrative    Method is Abbott Prostate-Specific Antigen (PSA)  Standard-WHO 1st International (90:10)      Your prostate blood test was normal, no worries.       Please call with questions or contact us using [employer].    Sincerely,        Electronically signed by Janak Smith MD

## 2021-06-20 NOTE — LETTER
Letter by Janak Smith MD at      Author: Janak Smith MD Service: -- Author Type: --    Filed:  Encounter Date: 3/17/2020 Status: (Other)         Binu Pineda  [address] [place] [address]             March 23, 2020        Dear Mr. Pineda,    Do you know what the medication from [facility] was?    There is no medication for snoring. You would need to see a ear, nose, and throat doctor.   I would advise that at this time because of the coronavirus.        Please call with questions or contact us using [employer].    Sincerely,        Electronically signed by Janak Smith MD

## 2021-06-20 NOTE — LETTER
Letter by Janak Smith MD at      Author: Janak Smith MD Service: -- Author Type: --    Filed:  Encounter Date: 9/21/2020 Status: (Other)         Binu Pineda  [address] [place] [address]      September 21, 2020      Dear Binu,    As a valued [facility] patient, your healthcare needs are our priority.  Your health care team has determined that you are due for an appointment regarding your 6 week follow up.    To help prevent delays in your care, please call the [facility] at [phone].    We look forward to partnering with you to achieve optimal health and wellbeing.    Sincerely,  Your care team at [facility]

## 2021-06-20 NOTE — LETTER
Letter by Janak Smith MD at      Author: Janak Smith MD Service: -- Author Type: --    Filed:  Encounter Date: 3/17/2020 Status: (Other)                                           March 23, 2020    Patient: Binu Pineda   MR Number: [id number]   YOB: 1955   Date of Visit: 3/17/2020     Dear Dr. Trevino Recipients:    Thank you for referring Binu Pineda to me for evaluation. Below are the relevant portions of my assessment and plan of care.    If you have questions, please do not hesitate to call me. I look forward to following Binu along with you.    Sincerely,        Janak Smith MD          CC  No Recipients  Janak Smith MD  3/23/2020 11:21 AM  Signed  Please send out a letter    Janak Smith MD  3/17/2020  2:07 PM  Signed  Have him find out what the medication from [facility] was.    There is no medication for snoring.  He would need to see a ear nose and throat doctor.  I would advise that at this time because of the coronavirus    Shannon Wright  3/17/2020 12:56 PM  Signed  PATIENT NEEDS QUERIQPINE AND NALTREXONE REFILLED .  ALSO A NEW MEDICATION FOR SNORING AND A MUSCLE RELAXER THAT HE GOT FROM [facility] . IF  CAN REFILL THIS HE COULD CANCEL THE TELEPHONE VISIT CAN THIS BE DONE???

## 2021-06-21 NOTE — LETTER
Letter by Trisha Mina MD at      Author: Trisha Mina MD Service: -- Author Type: --    Filed:  Encounter Date: 12/18/2020 Status: (Other)         Binu Pineda  [address]  [place] [address]             December 18, 2020         Dear Mr. Pineda,    Below are the results from your recent visit:    Resulted Orders   XR Cervical Spine 2 - 3 VWS    Narrative    EXAM: XR CERVICAL SPINE 2 - 3 VWS  LOCATION: [facility]  DATE/TIME: 12/17/2020 2:35 PM    INDICATION: Neck Pain.  COMPARISON: CT thoracic spine 04/28/2019.      Impression    Four views of the cervical spine obtained including frontal, lateral, open-mouth odontoid, and a swimmer's view. Despite submission of a swimmer's view, visualization is only possible through the inferior C6 level. There is anatomic   alignment. Vertebral body height is preserved from the craniocervical junction through the inferior C6 level. No fracture. The dens appears unremarkable.    Mild loss of disc height C2-C3 with mild to moderate loss of disc height C3-C4, C4-C5. Moderate loss of disc height C5-C6. Anterior marginal osteophytes C3, C4, C5 and C6 vertebral bodies. Prevertebral soft tissues within normal limits. Articular pillar   hypertrophic formation noted.         X-ray did confirm multilevel degenerative joint disease, if your symptoms persist I could have you see a specialist at the spine clinic    Please call with questions or contact us using [employer].    Sincerely,        Electronically signed by Trisha Mina MD        36.3

## 2021-06-21 NOTE — LETTER
Letter by Janak Smith MD at      Author: Janak Smith MD Service: -- Author Type: --    Filed:  Encounter Date: 11/9/2020 Status: (Other)         Binu Pineda  [address] [place] [address]      November 9, 2020      Dear Binu,    As a valued [facility] patient, your healthcare needs are our priority.  Your health care team has determined that you are due for an virtual visit appointment with your provider.    To help prevent delays in your care, please call the [facility] at [phone].    We look forward to partnering with you to achieve optimal health and wellbeing.    Sincerely,  Your care team at [facility] and [facility]

## 2021-06-21 NOTE — LETTER
Letter by Trisha Mina MD at      Author: Trisha Mina MD Service: -- Author Type: --    Filed:  Encounter Date: 12/21/2020 Status: (Other)         Binu Pineda  [address] [place] [address]             December 21, 2020         Dear Mr. Pineda,    Below are the results from your recent visit:    Resulted Orders   Hepatitis C RNA Quantitation by RT-PCR   Result Value Ref Range    HCV RNA Quant HCV RNA Not Detected HCVND [IU]/mL      Comment:      The BELTRAN AmpliPrep/BELTRAN TaqMan HCV Test is an FDA-approved in vitro nucleic  acid amplification test for the quantitation of HCV RNA in human plasma (ETDA  plasma) or serum using the BELTRAN AmpliPrep Instrument for automated viral  nucleic acid extraction and the BELTRAN TaqMan Analyzer or BELTRAN TaqMan for  automated Real Time PCR amplification and detection of the viral nucleic acid  target.  Titer results are reported in International Units/mL (IU/mL) using the 1st WHO  International standard for HCV for Nucleic Acid Amplification based assays.    Log of HCV RNA Qt Not Calculated <1.2 Log IU/mL      Comment:        Performed and/or entered by:  [facility]  [address] [place] [id number]       HCV PCR test was negative, probably cleared the virus or was just a false positive previous antibody test.    Please call with questions or contact us using [employer].    Sincerely,        Electronically signed by Trisha Mina MD

## 2021-06-21 NOTE — LETTER
Letter by Trisha Mina MD at      Author: Trisha Mina MD Service: -- Author Type: --    Filed:  Encounter Date: 12/21/2020 Status: (Other)         Binu Pineda  [address] [place] [address]             December 22, 2020         Dear Mr. Pineda,    Below are the results from your recent visit:    Resulted Orders   QTF-Mycobacterium tuberculosis by QuantiFERON-TB Gold Plus   Result Value Ref Range    QTF RESULT Negative Negative    QTF INTREPRETATION       No interferon-gamma response to M. tuberculosis antigens was detected.  Infecton with M. tuberculosis is unlikely.  A negative result alone does not exclude infection with M. tuberculosis    QTF NIL 0.06 IU/mL    QTF ANTIGEN TB1-NIL -0.01 IU/mL    QTF ANTIGEN TB2 - NIL 0.00 IU/mL    QTF MITOGEN-NIL >10.00 IU/mL   Hepatitis C RNA Quantitation by RT-PCR   Result Value Ref Range    HCV RNA Quant HCV RNA Not Detected HCVND [IU]/mL      Comment:      The BELTRAN AmpliPrep/BELTRAN TaqMan HCV Test is an FDA-approved in vitro nucleic  acid amplification test for the quantitation of HCV RNA in human plasma (ETDA  plasma) or serum using the BELTRAN AmpliPrep Instrument for automated viral  nucleic acid extraction and the BELTRAN TaqMan Analyzer or BELTRAN TaqMan for  automated Real Time PCR amplification and detection of the viral nucleic acid  target.  Titer results are reported in International Units/mL (IU/mL) using the 1st WHO  International standard for HCV for Nucleic Acid Amplification based assays.    Log of HCV RNA Qt Not Calculated <1.2 Log IU/mL      Comment:        Performed and/or entered by:  [facility]  [address] [place] [id number]       Negative screening TB test    Please call with questions or contact us using [employer].    Sincerely,        Electronically signed by Trisha Mina MD

## 2021-06-29 ENCOUNTER — RECORDS - HEALTHEAST (OUTPATIENT)
Dept: FAMILY MEDICINE | Facility: CLINIC | Age: 66
End: 2021-06-29

## 2021-06-29 DIAGNOSIS — S06.9X9S MAJOR NEUROCOGNITIVE DISORDER AS LATE EFFECT OF TRAUMATIC BRAIN INJURY WITH BEHAVIORAL DISTURBANCE (H): ICD-10-CM

## 2021-06-29 DIAGNOSIS — F02.818 MAJOR NEUROCOGNITIVE DISORDER AS LATE EFFECT OF TRAUMATIC BRAIN INJURY WITH BEHAVIORAL DISTURBANCE (H): ICD-10-CM

## 2021-06-29 RX ORDER — QUETIAPINE FUMARATE 25 MG/1
TABLET, FILM COATED ORAL
Qty: 30 TABLET | Refills: 1 | Status: SHIPPED | OUTPATIENT
Start: 2021-06-29 | End: 2021-08-23

## 2021-07-07 NOTE — TELEPHONE ENCOUNTER
RN cannot approve Refill Request    RN can NOT refill this medication med is not covered by policy/route to provider.  Last office visit:  3/25/2021.    Suyapa Zhao, [facility] Triage/Med Refill 6/29/2021    Requested Prescriptions   Pending Prescriptions Disp Refills     QUEtiapine (SEROQUEL) 25 MG tablet [Pharmacy Med Name: QUEtiapine Fumarate 25MG TABS] 30 tablet 2     Sig: TAKE 1 TABLET BY MOUTH EVERY NIGHT AT BEDTIME       There is no refill protocol information for this order

## 2021-07-09 ENCOUNTER — MEDICAL CORRESPONDENCE (OUTPATIENT)
Dept: HEALTH INFORMATION MANAGEMENT | Facility: CLINIC | Age: 66
End: 2021-07-09

## 2021-07-28 DIAGNOSIS — I10 ESSENTIAL HYPERTENSION: ICD-10-CM

## 2021-07-28 NOTE — TELEPHONE ENCOUNTER
Pending Prescriptions:                       Disp   Refills    losartan (COZAAR) 100 MG tablet           30 tab*2            Sig: [LOSARTAN (COZAAR) 100 MG TABLET] TAKE 1 TABLET           BY MOUTH DAILY

## 2021-08-02 RX ORDER — LOSARTAN POTASSIUM 100 MG/1
TABLET ORAL
Qty: 30 TABLET | Refills: 2 | Status: SHIPPED | OUTPATIENT
Start: 2021-08-02 | End: 2021-11-08

## 2021-08-02 NOTE — TELEPHONE ENCOUNTER
"Routing refill request to provider for review/approval because:  Labs not current:  Creatinine and potassium    Last Written Prescription Date:  5/4/21  Last Fill Quantity: 30,  # refills: 2   Last office visit provider:  3/25/21     Requested Prescriptions   Pending Prescriptions Disp Refills     losartan (COZAAR) 100 MG tablet 30 tablet 2     Sig: [LOSARTAN (COZAAR) 100 MG TABLET] TAKE 1 TABLET BY MOUTH DAILY       Angiotensin-II Receptors Failed - 7/28/2021  4:56 PM        Failed - Normal serum creatinine on file in past 12 months     Recent Labs   Lab Test 02/28/19  0509   CR 0.68*       Ok to refill medication if creatinine is low          Failed - Normal serum potassium on file in past 12 months     Recent Labs   Lab Test 02/28/19  0509   POTASSIUM 4.0                    Passed - Last blood pressure under 140/90 in past 12 months     BP Readings from Last 3 Encounters:   12/31/20 135/88   12/17/20 (!) 175/80   11/19/19 136/74                 Passed - Recent (12 mo) or future (30 days) visit within the authorizing provider's specialty     Patient has had an office visit with the authorizing provider or a provider within the authorizing providers department within the previous 12 mos or has a future within next 30 days. See \"Patient Info\" tab in inbasket, or \"Choose Columns\" in Meds & Orders section of the refill encounter.              Passed - Medication is active on med list        Passed - Patient is age 18 or older             Jessica Wise RN 08/02/21 11:13 AM  "

## 2021-08-03 PROBLEM — F10.930 ALCOHOL WITHDRAWAL SEIZURE WITHOUT COMPLICATION (H): Status: RESOLVED | Noted: 2019-08-27 | Resolved: 2020-12-18

## 2021-08-03 PROBLEM — R56.9 ALCOHOL WITHDRAWAL SEIZURE WITHOUT COMPLICATION (H): Status: RESOLVED | Noted: 2019-08-27 | Resolved: 2020-12-18

## 2021-08-23 DIAGNOSIS — F02.818 MAJOR NEUROCOGNITIVE DISORDER AS LATE EFFECT OF TRAUMATIC BRAIN INJURY WITH BEHAVIORAL DISTURBANCE (H): ICD-10-CM

## 2021-08-23 DIAGNOSIS — S06.9X9S MAJOR NEUROCOGNITIVE DISORDER AS LATE EFFECT OF TRAUMATIC BRAIN INJURY WITH BEHAVIORAL DISTURBANCE (H): ICD-10-CM

## 2021-08-23 RX ORDER — QUETIAPINE FUMARATE 25 MG/1
TABLET, FILM COATED ORAL
Qty: 30 TABLET | Refills: 1 | Status: SHIPPED | OUTPATIENT
Start: 2021-08-23 | End: 2021-11-08

## 2021-09-30 ENCOUNTER — VIRTUAL VISIT (OUTPATIENT)
Dept: FAMILY MEDICINE | Facility: CLINIC | Age: 66
End: 2021-09-30
Payer: COMMERCIAL

## 2021-09-30 VITALS
TEMPERATURE: 98.3 F | SYSTOLIC BLOOD PRESSURE: 129 MMHG | RESPIRATION RATE: 18 BRPM | DIASTOLIC BLOOD PRESSURE: 78 MMHG | OXYGEN SATURATION: 97 % | HEART RATE: 63 BPM

## 2021-09-30 DIAGNOSIS — F10.20 ALCOHOLISM (H): ICD-10-CM

## 2021-09-30 DIAGNOSIS — F33.1 MAJOR DEPRESSIVE DISORDER, RECURRENT EPISODE, MODERATE (H): ICD-10-CM

## 2021-09-30 DIAGNOSIS — F02.818 MAJOR NEUROCOGNITIVE DISORDER AS LATE EFFECT OF TRAUMATIC BRAIN INJURY WITH BEHAVIORAL DISTURBANCE (H): ICD-10-CM

## 2021-09-30 DIAGNOSIS — S06.9X9S MAJOR NEUROCOGNITIVE DISORDER AS LATE EFFECT OF TRAUMATIC BRAIN INJURY WITH BEHAVIORAL DISTURBANCE (H): ICD-10-CM

## 2021-09-30 DIAGNOSIS — E53.8 VITAMIN B12 DEFICIENCY (NON ANEMIC): ICD-10-CM

## 2021-09-30 DIAGNOSIS — G40.909 SEIZURE DISORDER (H): ICD-10-CM

## 2021-09-30 DIAGNOSIS — I10 ESSENTIAL HYPERTENSION: Primary | ICD-10-CM

## 2021-09-30 PROCEDURE — 99214 OFFICE O/P EST MOD 30 MIN: CPT | Mod: TEL | Performed by: FAMILY MEDICINE

## 2021-09-30 NOTE — PROGRESS NOTES
Binu is a 66 year old who is being evaluated via a billable telephone visit.      What phone number would you like to be contacted at? [phone]   How would you like to obtain your AVS? MyChart      ICD-10-CM    1. Essential hypertension  I10    2. 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 Vitamin B12 deficiency (non anemic)  E53.8    4. Seizure disorder (H)  G40.909    5. Alcoholism (H)  F10.20    6. Major depressive disorder, recurrent episode, moderate (H)  F33.1              Hypertension continue losartan    History of alcoholism with neurocognitive disorder after traumatic brain injury has been stable since he quit drinking    No seizures in the last year.    Up-to-date on immunizations.    Needs to come in for labs and a physical in about 6 weeks we will contact him to get him scheduled.            Total time spent on 9/30/2021: 30 minutes, for prechart planning, virtual visit, and documentation.            Subjective       HPI    Patient had a virtual visit, telephone, due to the coronavirus pandemic.    Patient was to come in the summer for a physical we will plan to schedule that in about 6 weeks and come in for a physical    He never came in get labs.  Never got the Keppra level or the B12 for the CBC.    We will get those along with other labs when he comes in    He did get both Pfizer vaccinations.  By the time he comes in he probably should get the booster.    He is on Keppra losartan Seroquel and vitamin B12.  He has had a regular refills.    He still living in a group home.    His last seizure was September 1, 2020.    He does not drink alcohol now for 28 months he states.    He has been compliant with medication.    Otherwise up-to-date with shots.  He will be getting flu shot it sounds like at the group home.    Review of Systems    10 point review of systems positive as outlined above otherwise  negative    Objective           Vitals:  No vitals were obtained today due to virtual visit.    Physical Exam   General: No acute distress talkative    HEENT denies any nasal drainage or sore throat or cough.    Lungs: Nonlabored breathing no wheezing    Heart: No palpitations or rapid heart rate    Abdomen denies any abdominal pain    Denies any focal weakness or numbness.    Denies any headaches    Denies any skin problems          Phone call duration: 21 minutes

## 2021-10-08 DIAGNOSIS — I69.30 SEQUELA OF LACUNAR INFARCTION: ICD-10-CM

## 2021-10-08 NOTE — TELEPHONE ENCOUNTER
Pending Prescriptions:                       Disp   Refills    aspirin (ASA) 81 MG EC tablet             90 tab*3            Sig: Take 1 tablet (81 mg) by mouth daily

## 2021-10-08 NOTE — TELEPHONE ENCOUNTER
"Prescription approved per [facility] Refill Protocol.      Last Written Prescription Date:  09/14/2020  Last Fill Quantity: 90,  # refills: 3   Last office visit provider:  03/25/2021 Luis     Requested Prescriptions   Pending Prescriptions Disp Refills     aspirin (ASA) 81 MG EC tablet 90 tablet 3     Sig: Take 1 tablet (81 mg) by mouth daily       Analgesics (Non-Narcotic Tylenol and ASA Only) Passed - 10/8/2021 11:33 AM        Passed - Recent (12 mo) or future (30 days) visit within the authorizing provider's specialty     Patient has had an office visit with the authorizing provider or a provider within the authorizing providers department within the previous 12 mos or has a future within next 30 days. See \"Patient Info\" tab in inbasket, or \"Choose Columns\" in Meds & Orders section of the refill encounter.              Passed - Patient is age 20 years or older     If ASA is flagged for ages under 20 years old. Forward to provider for confirmation Ryes Syndrome is not a concern.              Passed - Medication is active on med list             Carin Ashton RN 10/08/21 2:55 PM  "

## 2021-10-25 ENCOUNTER — TELEPHONE (OUTPATIENT)
Dept: FAMILY MEDICINE | Facility: CLINIC | Age: 66
End: 2021-10-25

## 2021-10-25 NOTE — TELEPHONE ENCOUNTER
"The pt is needing a \"Professional Statement of Need\" form filled out by Dr. Smith, he NTBS for these to be filled out.     Please call the pt and schedule an appointment with Dr. Smith (the forms are on the EAST in the NTBS folder).   "

## 2021-10-28 ENCOUNTER — TELEPHONE (OUTPATIENT)
Dept: FAMILY MEDICINE | Facility: CLINIC | Age: 66
End: 2021-10-28
Payer: COMMERCIAL

## 2021-10-28 NOTE — TELEPHONE ENCOUNTER
"Reason for Call:  Other     Detailed comments: calling to check status of \"statement of Need\" form that is to be completed by   Was faxed over 10/15 and last week     Phone Number Patient can be reached at: Other phone number:  [phone]   Can be faxed to [phone]    Best Time: anytime  Can we leave a detailed message on this number? YES    Call taken on 10/28/2021 at 12:08 PM by Inessa Multani    "

## 2021-11-05 DIAGNOSIS — F02.818 MAJOR NEUROCOGNITIVE DISORDER AS LATE EFFECT OF TRAUMATIC BRAIN INJURY WITH BEHAVIORAL DISTURBANCE (H): ICD-10-CM

## 2021-11-05 DIAGNOSIS — S06.9X9S MAJOR NEUROCOGNITIVE DISORDER AS LATE EFFECT OF TRAUMATIC BRAIN INJURY WITH BEHAVIORAL DISTURBANCE (H): ICD-10-CM

## 2021-11-05 DIAGNOSIS — I10 ESSENTIAL HYPERTENSION: ICD-10-CM

## 2021-11-05 NOTE — TELEPHONE ENCOUNTER
Pending Prescriptions:                       Disp   Refills    losartan (COZAAR) 100 MG tablet           30 tab*2            Sig: [LOSARTAN (COZAAR) 100 MG TABLET] TAKE 1 TABLET           BY MOUTH DAILY    QUEtiapine (SEROQUEL) 25 MG tablet        30 tab*1            Sig: [QUETIAPINE (SEROQUEL) 25 MG TABLET] TAKE 1           TABLET BY MOUTH EVERY NIGHT AT BEDTIME

## 2021-11-08 RX ORDER — LOSARTAN POTASSIUM 100 MG/1
TABLET ORAL
Qty: 30 TABLET | Refills: 2 | Status: SHIPPED | OUTPATIENT
Start: 2021-11-08 | End: 2022-02-11

## 2021-11-08 RX ORDER — QUETIAPINE FUMARATE 25 MG/1
TABLET, FILM COATED ORAL
Qty: 30 TABLET | Refills: 1 | Status: SHIPPED | OUTPATIENT
Start: 2021-11-08 | End: 2023-01-31

## 2021-11-08 NOTE — TELEPHONE ENCOUNTER
"Routing refill request to provider for review/approval because:  Labs not current:  CR, GLC, Lipid panel, CBC    Last Written Prescription Date:  8/2/21 Losartan   Last Fill Quantity: 30,  # refills: 2   Last office visit provider:  9/30/21       Last Written Prescription Date:  8/23/21 Seroquel   Last Fill Quantity: 30,  # refills: 1    Requested Prescriptions   Pending Prescriptions Disp Refills     losartan (COZAAR) 100 MG tablet 30 tablet 2     Sig: [LOSARTAN (COZAAR) 100 MG TABLET] TAKE 1 TABLET BY MOUTH DAILY       Angiotensin-II Receptors Failed - 11/5/2021 11:37 AM        Failed - Normal serum creatinine on file in past 12 months     Recent Labs   Lab Test 02/28/19  0509   CR 0.68*       Ok to refill medication if creatinine is low          Failed - Normal serum potassium on file in past 12 months     Recent Labs   Lab Test 02/28/19  0509   POTASSIUM 4.0                    Passed - Last blood pressure under 140/90 in past 12 months     BP Readings from Last 3 Encounters:   09/30/21 129/78   12/31/20 135/88   12/17/20 (!) 175/80                 Passed - Recent (12 mo) or future (30 days) visit within the authorizing provider's specialty     Patient has had an office visit with the authorizing provider or a provider within the authorizing providers department within the previous 12 mos or has a future within next 30 days. See \"Patient Info\" tab in inbasket, or \"Choose Columns\" in Meds & Orders section of the refill encounter.              Passed - Medication is active on med list        Passed - Patient is age 18 or older           QUEtiapine (SEROQUEL) 25 MG tablet 30 tablet 1     Sig: [QUETIAPINE (SEROQUEL) 25 MG TABLET] TAKE 1 TABLET BY MOUTH EVERY NIGHT AT BEDTIME       Antipsychotic Medications Failed - 11/5/2021 11:37 AM        Failed - Lipid panel on file within the past 12 months     Recent Labs   Lab Test 04/11/17  1420   CHOL 254*   TRIG 68                     Failed - CBC on file in " "past 12 months     No lab results found.              Failed - A1c or Glucose on file in past 12 months     Recent Labs   Lab Test 02/28/19  0509   GLC 86       Please review patients last 3 weights. If a weight gain of >10 lbs exists, you may refill the prescription once after instructing the patient to schedule an appointment within the next 30 days.    Wt Readings from Last 3 Encounters:   12/17/20 93.4 kg (206 lb)   11/19/19 86.6 kg (191 lb)   09/11/19 89.2 kg (196 lb 12 oz)             Passed - Blood pressure under 140/90 in past 12 months     BP Readings from Last 3 Encounters:   09/30/21 129/78   12/31/20 135/88   12/17/20 (!) 175/80                 Passed - Patient is 12 years of age or older        Passed - Heart Rate on file within past 12 months     Pulse Readings from Last 3 Encounters:   09/30/21 63   12/31/20 66   12/17/20 64               Passed - Medication is active on med list        Passed - Recent (6 mo) or future (30 days) visit within the authorizing provider's specialty     Patient had office visit in the last 6 months or has a visit in the next 30 days with authorizing provider or within the authorizing provider's specialty.  See \"Patient Info\" tab in inbasket, or \"Choose Columns\" in Meds & Orders section of the refill encounter.                 Ghislaine Sibley RN 11/08/21 2:20 PM  "

## 2021-11-09 ENCOUNTER — OFFICE VISIT (OUTPATIENT)
Dept: FAMILY MEDICINE | Facility: CLINIC | Age: 66
End: 2021-11-09
Payer: COMMERCIAL

## 2021-11-09 VITALS
TEMPERATURE: 98.3 F | WEIGHT: 205 LBS | BODY MASS INDEX: 32.18 KG/M2 | DIASTOLIC BLOOD PRESSURE: 75 MMHG | RESPIRATION RATE: 20 BRPM | HEIGHT: 67 IN | HEART RATE: 63 BPM | SYSTOLIC BLOOD PRESSURE: 133 MMHG

## 2021-11-09 DIAGNOSIS — F10.20 ALCOHOLISM (H): ICD-10-CM

## 2021-11-09 DIAGNOSIS — F33.1 MAJOR DEPRESSIVE DISORDER, RECURRENT EPISODE, MODERATE (H): ICD-10-CM

## 2021-11-09 DIAGNOSIS — I10 ESSENTIAL HYPERTENSION: ICD-10-CM

## 2021-11-09 DIAGNOSIS — F02.818 MAJOR NEUROCOGNITIVE DISORDER AS LATE EFFECT OF TRAUMATIC BRAIN INJURY WITH BEHAVIORAL DISTURBANCE (H): Primary | ICD-10-CM

## 2021-11-09 DIAGNOSIS — G40.909 SEIZURE DISORDER (H): ICD-10-CM

## 2021-11-09 DIAGNOSIS — E53.8 VITAMIN B12 DEFICIENCY (NON ANEMIC): ICD-10-CM

## 2021-11-09 DIAGNOSIS — Z71.85 IMMUNIZATION COUNSELING: ICD-10-CM

## 2021-11-09 DIAGNOSIS — Z71.6 ENCOUNTER FOR TOBACCO USE CESSATION COUNSELING: ICD-10-CM

## 2021-11-09 DIAGNOSIS — S06.9X9S MAJOR NEUROCOGNITIVE DISORDER AS LATE EFFECT OF TRAUMATIC BRAIN INJURY WITH BEHAVIORAL DISTURBANCE (H): Primary | ICD-10-CM

## 2021-11-09 LAB
ALBUMIN SERPL-MCNC: 3.7 G/DL (ref 3.5–5)
ALP SERPL-CCNC: 60 U/L (ref 45–120)
ALT SERPL W P-5'-P-CCNC: 13 U/L (ref 0–45)
ANION GAP SERPL CALCULATED.3IONS-SCNC: 12 MMOL/L (ref 5–18)
AST SERPL W P-5'-P-CCNC: 26 U/L (ref 0–40)
BILIRUB SERPL-MCNC: 0.5 MG/DL (ref 0–1)
BUN SERPL-MCNC: 13 MG/DL (ref 8–22)
CALCIUM SERPL-MCNC: 9.1 MG/DL (ref 8.5–10.5)
CHLORIDE BLD-SCNC: 105 MMOL/L (ref 98–107)
CHOLEST SERPL-MCNC: 210 MG/DL
CO2 SERPL-SCNC: 20 MMOL/L (ref 22–31)
CREAT SERPL-MCNC: 1.04 MG/DL (ref 0.7–1.3)
ERYTHROCYTE [DISTWIDTH] IN BLOOD BY AUTOMATED COUNT: 13.1 % (ref 10–15)
FASTING STATUS PATIENT QL REPORTED: ABNORMAL
GFR SERPL CREATININE-BSD FRML MDRD: 76 ML/MIN/1.73M2
GLUCOSE BLD-MCNC: 94 MG/DL (ref 70–125)
HCT VFR BLD AUTO: 40.9 % (ref 40–53)
HDLC SERPL-MCNC: 57 MG/DL
HGB BLD-MCNC: 13.2 G/DL (ref 13.3–17.7)
LDLC SERPL CALC-MCNC: 134 MG/DL
LEVETIRACETAM (KEPPRA): 14.6 UG/ML (ref 6–46)
MCH RBC QN AUTO: 31.2 PG (ref 26.5–33)
MCHC RBC AUTO-ENTMCNC: 32.3 G/DL (ref 31.5–36.5)
MCV RBC AUTO: 97 FL (ref 78–100)
PLATELET # BLD AUTO: 184 10E3/UL (ref 150–450)
POTASSIUM BLD-SCNC: 4.5 MMOL/L (ref 3.5–5)
PROT SERPL-MCNC: 7.8 G/DL (ref 6–8)
RBC # BLD AUTO: 4.23 10E6/UL (ref 4.4–5.9)
SODIUM SERPL-SCNC: 137 MMOL/L (ref 136–145)
TRIGL SERPL-MCNC: 93 MG/DL
VIT B12 SERPL-MCNC: 700 PG/ML (ref 213–816)
WBC # BLD AUTO: 6 10E3/UL (ref 4–11)

## 2021-11-09 PROCEDURE — 91300 PR COVID VAC PFIZER DIL RECON 30 MCG/0.3 ML IM: CPT | Performed by: FAMILY MEDICINE

## 2021-11-09 PROCEDURE — 80061 LIPID PANEL: CPT | Performed by: FAMILY MEDICINE

## 2021-11-09 PROCEDURE — 90682 RIV4 VACC RECOMBINANT DNA IM: CPT | Performed by: FAMILY MEDICINE

## 2021-11-09 PROCEDURE — 99214 OFFICE O/P EST MOD 30 MIN: CPT | Mod: 25 | Performed by: FAMILY MEDICINE

## 2021-11-09 PROCEDURE — 82607 VITAMIN B-12: CPT | Performed by: FAMILY MEDICINE

## 2021-11-09 PROCEDURE — 85027 COMPLETE CBC AUTOMATED: CPT | Performed by: FAMILY MEDICINE

## 2021-11-09 PROCEDURE — 80177 DRUG SCRN QUAN LEVETIRACETAM: CPT | Performed by: FAMILY MEDICINE

## 2021-11-09 PROCEDURE — 0004A PR COVID VAC PFIZER DIL RECON 30 MCG/0.3 ML IM: CPT | Performed by: FAMILY MEDICINE

## 2021-11-09 PROCEDURE — 36415 COLL VENOUS BLD VENIPUNCTURE: CPT | Performed by: FAMILY MEDICINE

## 2021-11-09 PROCEDURE — 90471 IMMUNIZATION ADMIN: CPT | Performed by: FAMILY MEDICINE

## 2021-11-09 PROCEDURE — 80053 COMPREHEN METABOLIC PANEL: CPT | Performed by: FAMILY MEDICINE

## 2021-11-09 ASSESSMENT — MIFFLIN-ST. JEOR: SCORE: 1678.5

## 2021-11-09 NOTE — PROGRESS NOTES
Assessment & Plan        ICD-10-CM    1. 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 Vitamin B12 deficiency (non anemic)  E53.8 Vitamin B12     CBC with platelets   3. Seizure disorder (H)  G40.909 Keppra (Levetiracetam) Level     Comprehensive metabolic panel (BMP + Alb, Alk Phos, ALT, AST, Total. Bili, TP)   4. Alcoholism (H)  F10.20    5. Essential hypertension  I10 Comprehensive metabolic panel (BMP + Alb, Alk Phos, ALT, AST, Total. Bili, TP)     Lipid Profile (Chol, Trig, HDL, LDL calc)   6. Major depressive disorder, recurrent episode, moderate (H)  F33.1    7. Immunization counseling  Z71.85    8. Encounter for tobacco use cessation counseling  Z71.6 CT Chest Lung Cancer Scrn Low Dose wo      Patient has a disability from his traumatic brain injury, neurocognitive disorder    He is no longer using drugs or drinking alcohol.    He will continue on Keppra    We will get back to him regarding the Keppra level and B12 level continue on B12 orally daily    Hypertension controlled.  Has been on losartan 100 mg a day.    History of smoking encouraged quitting we will check a CT low-dose lung for lung cancer screening.    PHQ-9 today was 0.    Patient be contacted with the results of the labs and the low-dose CT    Immunization update with the COVID-19 booster, Pfizer as well as flu shot          Return in about 6 months (around 5/9/2022) for Follow up. for recheck.        Subjective:    This 66 year old male was seen today for follow-up.    Needed a form filled out regarding his disability from his traumatic brain injury he has had neurocognitive disorder.    He has had seizures he is on Keppra for that we will check a level he is on 750 mg twice a day    Also takes B12 orally once a day.  He is on Seroquel at bedtime    He has been sober now for 2-1/2 years and that is been great for him.    He denies any significant depression  "now.    He still smokes about 5 cigarettes a day he smoked about 40 to 45 pack years.  He is can get a low-dose CT scan of the lung regarding that    He is due for COVID-19 booster, Pfizer also flu shot.  These were both given    Labs today include Keppra B12 CBC CMP and lipid.    Patient stays active with biking.    No additional concern or issue.    Review of Systems    10 point review of systems positive as outlined above otherwise negative    Current Outpatient Medications   Medication     acetaminophen (TYLENOL) 500 MG tablet     aspirin (ASA) 81 MG EC tablet     cyanocobalamin 1000 MCG tablet     levETIRAcetam (KEPPRA) 750 MG tablet     losartan (COZAAR) 100 MG tablet     QUEtiapine (SEROQUEL) 25 MG tablet     QUEtiapine (SEROQUEL) 25 MG tablet     No current facility-administered medications for this visit.       Objective    Vitals: /75 (BP Location: Left arm, Patient Position: Sitting, Cuff Size: Adult Large)   Pulse 63   Temp 98.3  F (36.8  C) (Temporal)   Resp 20   Ht 1.702 m (5' 7\")   Wt 93 kg (205 lb)   BMI 32.11 kg/m    BMI= Body mass index is 32.11 kg/m .     Physical Exam    General appearance no acute distress    Vital signs are stable blood pressure looks good    HEENT unremarkable no neck pain no stiffness    Lungs are clear throughout no rales or rhonchi, heart is regular S1-S2.  Rate at 60-70.    Abdomen nontender    Extremities without edema    No focal weakness or numbness.    Lab work pending Keppra level B12 level CBC CMP and lipid.        Janak Smith MD   "

## 2021-11-10 ASSESSMENT — PATIENT HEALTH QUESTIONNAIRE - PHQ9: SUM OF ALL RESPONSES TO PHQ QUESTIONS 1-9: 0

## 2021-11-23 ENCOUNTER — HOSPITAL ENCOUNTER (OUTPATIENT)
Dept: CT IMAGING | Facility: HOSPITAL | Age: 66
Discharge: HOME | End: 2021-11-23
Attending: FAMILY MEDICINE | Admitting: FAMILY MEDICINE
Payer: COMMERCIAL

## 2021-11-23 ENCOUNTER — HOSPITAL ENCOUNTER (EMERGENCY)
Facility: HOSPITAL | Age: 66
End: 2021-11-23
Payer: COMMERCIAL

## 2021-11-23 DIAGNOSIS — Z71.6 ENCOUNTER FOR TOBACCO USE CESSATION COUNSELING: ICD-10-CM

## 2021-11-23 PROCEDURE — 71271 CT THORAX LUNG CANCER SCR C-: CPT

## 2021-12-28 DIAGNOSIS — F02.818 MAJOR NEUROCOGNITIVE DISORDER AS LATE EFFECT OF TRAUMATIC BRAIN INJURY WITH BEHAVIORAL DISTURBANCE (H): ICD-10-CM

## 2021-12-28 DIAGNOSIS — Z76.0 ENCOUNTER FOR MEDICATION REFILL: Primary | ICD-10-CM

## 2021-12-28 DIAGNOSIS — S06.9X9S MAJOR NEUROCOGNITIVE DISORDER AS LATE EFFECT OF TRAUMATIC BRAIN INJURY WITH BEHAVIORAL DISTURBANCE (H): ICD-10-CM

## 2021-12-30 RX ORDER — QUETIAPINE FUMARATE 25 MG/1
TABLET, FILM COATED ORAL
Qty: 90 TABLET | Refills: 1 | Status: SHIPPED | OUTPATIENT
Start: 2021-12-30 | End: 2022-07-09

## 2021-12-30 NOTE — TELEPHONE ENCOUNTER
"Routing refill request to provider for review/approval because:  A break in medication    Last Written Prescription Date:  4/7/21  Last Fill Quantity: 30,  # refills: 2   Last office visit provider:  11/9/21     Requested Prescriptions   Pending Prescriptions Disp Refills     QUEtiapine (SEROQUEL) 25 MG tablet 90 tablet 3     Sig: [QUETIAPINE (SEROQUEL) 25 MG TABLET] TAKE 1 TABLET BY MOUTH EVERY NIGHT AT BEDTIME       Antipsychotic Medications Passed - 12/28/2021 11:26 AM        Passed - Blood pressure under 140/90 in past 12 months     BP Readings from Last 3 Encounters:   11/09/21 133/75   09/30/21 129/78   12/31/20 135/88                 Passed - Patient is 12 years of age or older        Passed - Lipid panel on file within the past 12 months     Recent Labs   Lab Test 11/09/21  1522   CHOL 210*   TRIG 93   HDL 57   *               Passed - CBC on file in past 12 months     Recent Labs   Lab Test 11/09/21  1522   WBC 6.0   RBC 4.23*   HGB 13.2*   HCT 40.9                    Passed - Heart Rate on file within past 12 months     Pulse Readings from Last 3 Encounters:   11/09/21 63   09/30/21 63   12/31/20 66               Passed - A1c or Glucose on file in past 12 months     Recent Labs   Lab Test 11/09/21  1522   GLC 94       Please review patients last 3 weights. If a weight gain of >10 lbs exists, you may refill the prescription once after instructing the patient to schedule an appointment within the next 30 days.    Wt Readings from Last 3 Encounters:   11/09/21 93 kg (205 lb)   12/17/20 93.4 kg (206 lb)   11/19/19 86.6 kg (191 lb)             Passed - Medication is active on med list        Passed - Recent (6 mo) or future (30 days) visit within the authorizing provider's specialty     Patient had office visit in the last 6 months or has a visit in the next 30 days with authorizing provider or within the authorizing provider's specialty.  See \"Patient Info\" tab in inbasket, or \"Choose Columns\" in " Meds & Orders section of the refill encounter.                 Binu Tillman RN 12/30/21 2:11 PM

## 2022-02-11 DIAGNOSIS — Z76.0 ENCOUNTER FOR MEDICATION REFILL: Primary | ICD-10-CM

## 2022-02-11 DIAGNOSIS — I10 ESSENTIAL HYPERTENSION: ICD-10-CM

## 2022-02-11 RX ORDER — LOSARTAN POTASSIUM 100 MG/1
TABLET ORAL
Qty: 90 TABLET | Refills: 1 | Status: SHIPPED | OUTPATIENT
Start: 2022-02-11 | End: 2022-08-09

## 2022-02-11 NOTE — TELEPHONE ENCOUNTER
Medication Question or Refill    Who is calling: deena WELLS    What medication are you calling about (include dose and sig)?: losartan pot 100 mg take one daily    Who prescribed the medication?: PCP    Do you need a refill? Yes: script     When did you use the medication last? unknown    Patient offered an appointment? No    Do you have any questions or concerns?  No    Okay to leave a detailed message?: No     Call taken On 22 at 1230 pm by Yoly Garcia CMA, CMT

## 2022-02-15 ENCOUNTER — MEDICAL CORRESPONDENCE (OUTPATIENT)
Dept: HEALTH INFORMATION MANAGEMENT | Facility: CLINIC | Age: 67
End: 2022-02-15
Payer: COMMERCIAL

## 2022-05-25 DIAGNOSIS — G40.909 SEIZURE DISORDER (H): ICD-10-CM

## 2022-05-26 DIAGNOSIS — G40.909 SEIZURE DISORDER (H): ICD-10-CM

## 2022-05-27 RX ORDER — LEVETIRACETAM 750 MG/1
TABLET ORAL
Qty: 60 TABLET | Refills: 0 | Status: SHIPPED | OUTPATIENT
Start: 2022-05-27 | End: 2022-06-22

## 2022-05-27 NOTE — TELEPHONE ENCOUNTER
Routing refill request to provider for review/approval because:  Drug not on the [facility] refill protocol     Last Written Prescription Date:  4/7/22  Last Fill Quantity: 60,  # refills: 0   Last office visit provider:  11/9/21     Requested Prescriptions   Pending Prescriptions Disp Refills     levETIRAcetam (KEPPRA) 750 MG tablet 60 tablet 0       There is no refill protocol information for this order          Rebeca Otero RN 05/26/22 7:55 PM

## 2022-05-27 NOTE — TELEPHONE ENCOUNTER
Routing refill request to provider for review/approval because:  Drug not on the [facility] refill protocol     Last Written Prescription Date:  4/7/22  Last Fill Quantity: 60,  # refills: 0   Last office visit provider:  11/9/21     Requested Prescriptions   Pending Prescriptions Disp Refills     levETIRAcetam (KEPPRA) 750 MG tablet [Pharmacy Med Name: levETIRAcetam 750MG TABS*] 60 tablet 0     Sig: TAKE 1 TABLET BY MOUTH TWICE A DAY       There is no refill protocol information for this order          Rebeca Otero, RN 05/26/22 10:19 PM

## 2022-05-28 RX ORDER — LEVETIRACETAM 750 MG/1
TABLET ORAL
Qty: 60 TABLET | Refills: 0 | Status: SHIPPED | OUTPATIENT
Start: 2022-05-28 | End: 2023-02-02

## 2022-06-15 ENCOUNTER — OFFICE VISIT (OUTPATIENT)
Dept: FAMILY MEDICINE | Facility: CLINIC | Age: 67
End: 2022-06-15
Payer: COMMERCIAL

## 2022-06-15 VITALS
WEIGHT: 208 LBS | TEMPERATURE: 98.2 F | HEIGHT: 67 IN | DIASTOLIC BLOOD PRESSURE: 81 MMHG | SYSTOLIC BLOOD PRESSURE: 130 MMHG | HEART RATE: 67 BPM | BODY MASS INDEX: 32.65 KG/M2

## 2022-06-15 DIAGNOSIS — F02.818 MAJOR NEUROCOGNITIVE DISORDER AS LATE EFFECT OF TRAUMATIC BRAIN INJURY WITH BEHAVIORAL DISTURBANCE (H): Primary | ICD-10-CM

## 2022-06-15 DIAGNOSIS — I10 ESSENTIAL HYPERTENSION: ICD-10-CM

## 2022-06-15 DIAGNOSIS — S06.9X9S MAJOR NEUROCOGNITIVE DISORDER AS LATE EFFECT OF TRAUMATIC BRAIN INJURY WITH BEHAVIORAL DISTURBANCE (H): Primary | ICD-10-CM

## 2022-06-15 DIAGNOSIS — E53.8 VITAMIN B12 DEFICIENCY (NON ANEMIC): ICD-10-CM

## 2022-06-15 DIAGNOSIS — Z71.85 IMMUNIZATION COUNSELING: ICD-10-CM

## 2022-06-15 DIAGNOSIS — E78.5 HYPERLIPIDEMIA, UNSPECIFIED HYPERLIPIDEMIA TYPE: ICD-10-CM

## 2022-06-15 DIAGNOSIS — G40.909 SEIZURE DISORDER (H): ICD-10-CM

## 2022-06-15 LAB
ANION GAP SERPL CALCULATED.3IONS-SCNC: 10 MMOL/L (ref 5–18)
BUN SERPL-MCNC: 13 MG/DL (ref 8–22)
CALCIUM SERPL-MCNC: 8.8 MG/DL (ref 8.5–10.5)
CHLORIDE BLD-SCNC: 103 MMOL/L (ref 98–107)
CHOLEST SERPL-MCNC: 234 MG/DL
CO2 SERPL-SCNC: 24 MMOL/L (ref 22–31)
CREAT SERPL-MCNC: 0.71 MG/DL (ref 0.7–1.3)
ERYTHROCYTE [DISTWIDTH] IN BLOOD BY AUTOMATED COUNT: 12.6 % (ref 10–15)
FASTING STATUS PATIENT QL REPORTED: NO
GFR SERPL CREATININE-BSD FRML MDRD: >90 ML/MIN/1.73M2
GLUCOSE BLD-MCNC: 93 MG/DL (ref 70–125)
HCT VFR BLD AUTO: 41.4 % (ref 40–53)
HDLC SERPL-MCNC: 52 MG/DL
HGB BLD-MCNC: 13.5 G/DL (ref 13.3–17.7)
LDLC SERPL CALC-MCNC: 144 MG/DL
MCH RBC QN AUTO: 30.6 PG (ref 26.5–33)
MCHC RBC AUTO-ENTMCNC: 32.6 G/DL (ref 31.5–36.5)
MCV RBC AUTO: 94 FL (ref 78–100)
PLATELET # BLD AUTO: 243 10E3/UL (ref 150–450)
POTASSIUM BLD-SCNC: 4.2 MMOL/L (ref 3.5–5)
RBC # BLD AUTO: 4.41 10E6/UL (ref 4.4–5.9)
SODIUM SERPL-SCNC: 137 MMOL/L (ref 136–145)
TRIGL SERPL-MCNC: 191 MG/DL
WBC # BLD AUTO: 8.6 10E3/UL (ref 4–11)

## 2022-06-15 PROCEDURE — 90471 IMMUNIZATION ADMIN: CPT | Performed by: FAMILY MEDICINE

## 2022-06-15 PROCEDURE — 99000 SPECIMEN HANDLING OFFICE-LAB: CPT | Performed by: FAMILY MEDICINE

## 2022-06-15 PROCEDURE — 80048 BASIC METABOLIC PNL TOTAL CA: CPT | Performed by: FAMILY MEDICINE

## 2022-06-15 PROCEDURE — 36415 COLL VENOUS BLD VENIPUNCTURE: CPT | Performed by: FAMILY MEDICINE

## 2022-06-15 PROCEDURE — 80061 LIPID PANEL: CPT | Performed by: FAMILY MEDICINE

## 2022-06-15 PROCEDURE — 85027 COMPLETE CBC AUTOMATED: CPT | Performed by: FAMILY MEDICINE

## 2022-06-15 PROCEDURE — 90670 PCV13 VACCINE IM: CPT | Performed by: FAMILY MEDICINE

## 2022-06-15 PROCEDURE — 99214 OFFICE O/P EST MOD 30 MIN: CPT | Mod: 25 | Performed by: FAMILY MEDICINE

## 2022-06-15 PROCEDURE — 80177 DRUG SCRN QUAN LEVETIRACETAM: CPT | Mod: 90 | Performed by: FAMILY MEDICINE

## 2022-06-15 PROCEDURE — 0054A COVID-19,PF,PFIZER (12+ YRS): CPT | Performed by: FAMILY MEDICINE

## 2022-06-15 PROCEDURE — 91305 COVID-19,PF,PFIZER (12+ YRS): CPT | Performed by: FAMILY MEDICINE

## 2022-06-15 ASSESSMENT — PATIENT HEALTH QUESTIONNAIRE - PHQ9
SUM OF ALL RESPONSES TO PHQ QUESTIONS 1-9: 3
SUM OF ALL RESPONSES TO PHQ QUESTIONS 1-9: 3
10. IF YOU CHECKED OFF ANY PROBLEMS, HOW DIFFICULT HAVE THESE PROBLEMS MADE IT FOR YOU TO DO YOUR WORK, TAKE CARE OF THINGS AT HOME, OR GET ALONG WITH OTHER PEOPLE: SOMEWHAT DIFFICULT

## 2022-06-15 NOTE — PROGRESS NOTES
ICD-10-CM    1. 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 Vitamin B12 deficiency (non anemic)  E53.8 CBC with platelets   3. Seizure disorder (H)  G40.909 Keppra (Levetiracetam) Level   4. Essential hypertension  I10 Basic metabolic panel  (Ca, Cl, CO2, Creat, Gluc, K, Na, BUN)   5. Immunization counseling  Z71.85 PCV13, IM (6+ WK) - [id number]   6. Hyperlipidemia, unspecified hyperlipidemia type  E78.5 Lipid Profile (Chol, Trig, HDL, LDL calc)     Major neurocognitive disorder secondary to traumatic brain injury unchanged.    Vitamin B12 deficiency I did recheck CBC.  He will continue with 1 tablet daily    Seizure disorder on Keppra check Keppra level he has had no seizures    History of alcohol abuse he will 4 years from his last drink of alcohol, this July.    Immunization up to date with PCV 13 and COVID booster, Pfizer    Hyperlipidemia diet control recheck lipid.    Hypertension controlled on losartan.        Patient be contacted with the lab results and discuss follow-up.  He knows I am retiring    He will plan to follow-up with one of my partners in about 6 months        Subjective   Binu is a 67 year old comes in for follow-up.    Last year back in November.    He is due for COVID booster Pfizer as well as PCV13 shot.  These were given.    Patient has history of major neurocognitive disorder secondary to traumatic brain injury.    He is on Seroquel for that also takes Keppra to prevent seizures.    Blood pressure controlled on losartan also takes B12 orally    Back in November B12 level was 700 hemoglobin 13.2 Keppra level therapeutic and lipid panel showed total cholesterol 210 HDL 57 .    Treatment plan to work on diet exercise.  I did recheck lipid BMP Keppra and CBC.    He is living in assisted living and I signed orders.  He was with a care attendant.    There was a form from his  regarding Social  "Security disability.  I filled out the form.  All the questions had to do with musculoskeletal restrictions.    He is active bikes does not have any restrictions regarding activities.    He is unable to work because of his neurocognitive deficits.    Please see the form.        History of Present Illness       Mental Health Follow-up:  Patient presents to follow-up on Depression.Patient's depression since last visit has been:  Bad  The patient is not having other symptoms associated with depression.      Any significant life events: job concerns and financial concerns  Patient is not feeling anxious or having panic attacks.  Patient has no concerns about alcohol or drug use.    He eats 2-3 servings of fruits and vegetables daily.He consumes 2 sweetened beverage(s) daily.He exercises with enough effort to increase his heart rate 60 or more minutes per day.  He exercises with enough effort to increase his heart rate 7 days per week.   He is taking medications regularly.    Today's PHQ-9         PHQ-9 Total Score: 3    PHQ-9 Q9 Thoughts of better off dead/self-harm past 2 weeks :   Not at all    How difficult have these problems made it for you to do your work, take care of things at home, or get along with other people: Somewhat difficult         Review of Systems   10 point review of systems positive as outlined above otherwise in the      Objective    /81 (BP Location: Right arm, Patient Position: Sitting, Cuff Size: Adult Large)   Pulse 67   Temp 98.2  F (36.8  C) (Temporal)   Ht 1.702 m (5' 7.01\")   Wt 94.3 kg (208 lb)   BMI 32.57 kg/m    Body mass index is 32.57 kg/m .  Physical Exam   General appearance no acute distress    HEENT unremarkable neck supple    Lungs clear no rales or rhonchi heart regular S1-S2 extremities without edema    No focal weakness or numbness, good range of motion.    Lab work lipid BMP And CBC pending.            .  ..  "

## 2022-06-16 LAB — LEVETIRACETAM SERPL-MCNC: 17 UG/ML

## 2022-06-19 DIAGNOSIS — E78.5 HYPERLIPIDEMIA LDL GOAL <100: Primary | ICD-10-CM

## 2022-06-19 RX ORDER — ATORVASTATIN CALCIUM 20 MG/1
20 TABLET, FILM COATED ORAL DAILY
Qty: 30 TABLET | Refills: 2 | Status: SHIPPED | OUTPATIENT
Start: 2022-06-19 | End: 2022-09-07

## 2022-06-21 ENCOUNTER — TELEPHONE (OUTPATIENT)
Dept: FAMILY MEDICINE | Facility: CLINIC | Age: 67
End: 2022-06-21

## 2022-06-21 NOTE — TELEPHONE ENCOUNTER
----- Message from Janak Smith MD sent at 6/19/2022  9:23 AM CDT -----  Please contact this patient.  Let him know that the Keppra level was good the kidney function was normal, the hemoglobin and white count were normal.    Unfortunately the cholesterol was quite high at 234 and the bad cholesterol 144.    He has a 22% risk of having a heart attack or stroke in the next 10 years based on this.    He needs to stop smoking., and go on a cholesterol-lowering medicine.    I think he should go on atorvastatin 20 mg 1 po every day.    I sent a prescription to his pharmacy for 30 tablets with 2 refills.  He needs to follow-up with one of my partners in 2 to 3 months for recheck.  He knows I am retiring July 1.    Stay on the same other medication        The 10-year ASCVD risk score (Aliza GOMES Jr., et al., 2013) is: 22.4%    Values used to calculate the score:      Age: 67 years      Sex: Male      Is Non- : No      Diabetic: No      Tobacco smoker: Yes      Systolic Blood Pressure: 130 mmHg      Is BP treated: Yes      HDL Cholesterol: 52 mg/dL      Total Cholesterol: 234 mg/dL

## 2022-06-21 NOTE — TELEPHONE ENCOUNTER
RN attempt to call patient regarding lab results per Dr. Smith's message below. Patient notes to call back within 1 hr as he is currently not home.    Will try to call patient another time.    CHAD Medina, RN   [facility]

## 2022-06-22 DIAGNOSIS — E53.8 VITAMIN B12 DEFICIENCY (NON ANEMIC): ICD-10-CM

## 2022-06-22 NOTE — TELEPHONE ENCOUNTER
RN spoke to patient and message relayed to patient.     Patient verbalizes understanding and has no further questions.    Closing encounter.    KIRBY MedinaN, RN   [facility]

## 2022-06-22 NOTE — TELEPHONE ENCOUNTER
S-(situation): Patient was seen in clinic on 6/15/2022 by Dr Smith and patient requesting medication refill.  vitamin B-12 (CYANOCOBALAMIN) 1000 MCG tablet    R-(recommendations): Message sent to Dr Tammi PINO for refill request.  Medication pended and routed to Dr Cadena for cosign    Chandni Sauceda RN  [facility]

## 2022-06-22 NOTE — TELEPHONE ENCOUNTER
TELEPHONE CALL -    Reason for call-  Prescription   He is calling for the prescription of:   vitamin B-12 (CYANOCOBALAMIN) 1000 MCG tablet   Last prescribed 12/2021 - (for 6 months only)   he was last seen in the office 06/15/22 and labs were done  Pt - he needs Rx o be sent to his  [place] pharmacy      His PCP Janak Smith MD  [facility]    Informed caller that RN will send a note to PCP/Provider s Care Team   No TRAIGE required at this time.   Prachi James RN [facility] Nurse Advisor,  4:33 PM 6/22/2022

## 2022-07-08 DIAGNOSIS — F02.818 MAJOR NEUROCOGNITIVE DISORDER AS LATE EFFECT OF TRAUMATIC BRAIN INJURY WITH BEHAVIORAL DISTURBANCE (H): ICD-10-CM

## 2022-07-08 DIAGNOSIS — Z76.0 ENCOUNTER FOR MEDICATION REFILL: ICD-10-CM

## 2022-07-08 DIAGNOSIS — S06.9X9S MAJOR NEUROCOGNITIVE DISORDER AS LATE EFFECT OF TRAUMATIC BRAIN INJURY WITH BEHAVIORAL DISTURBANCE (H): ICD-10-CM

## 2022-07-09 RX ORDER — QUETIAPINE FUMARATE 25 MG/1
TABLET, FILM COATED ORAL
Qty: 90 TABLET | Refills: 3 | Status: SHIPPED | OUTPATIENT
Start: 2022-07-09

## 2022-07-10 NOTE — TELEPHONE ENCOUNTER
"Last Written Prescription Date:  12/30/21  Last Fill Quantity: 90,  # refills: 1   Last office visit provider:  6/15/22     Requested Prescriptions   Pending Prescriptions Disp Refills     QUEtiapine (SEROQUEL) 25 MG tablet [Pharmacy Med Name: QUEtiapine Fumarate 25MG TABS*] 30 tablet      Sig: TAKE 1 TABLET BY MOUTH EVERY NIGHT AT BEDTIME       Antipsychotic Medications Passed - 7/8/2022  1:03 PM        Passed - Blood pressure under 140/90 in past 12 months     BP Readings from Last 3 Encounters:   06/15/22 130/81   11/09/21 133/75   09/30/21 129/78                 Passed - Patient is 12 years of age or older        Passed - Lipid panel on file within the past 12 months     Recent Labs   Lab Test 06/15/22  1445   CHOL 234*   TRIG 191*   HDL 52   *               Passed - CBC on file in past 12 months     Recent Labs   Lab Test 06/15/22  1445   WBC 8.6   RBC 4.41   HGB 13.5   HCT 41.4                    Passed - Heart Rate on file within past 12 months     Pulse Readings from Last 3 Encounters:   06/15/22 67   11/09/21 63   09/30/21 63               Passed - A1c or Glucose on file in past 12 months     Recent Labs   Lab Test 06/15/22  1445   GLC 93       Please review patients last 3 weights. If a weight gain of >10 lbs exists, you may refill the prescription once after instructing the patient to schedule an appointment within the next 30 days.    Wt Readings from Last 3 Encounters:   06/15/22 94.3 kg (208 lb)   11/09/21 93 kg (205 lb)   12/17/20 93.4 kg (206 lb)             Passed - Medication is active on med list        Passed - Recent (6 mo) or future (30 days) visit within the authorizing provider's specialty     Patient had office visit in the last 6 months or has a visit in the next 30 days with authorizing provider or within the authorizing provider's specialty.  See \"Patient Info\" tab in inbasket, or \"Choose Columns\" in Meds & Orders section of the refill encounter.                 Hattie" Olivier 07/09/22 8:29 PM

## 2022-09-06 DIAGNOSIS — E78.5 HYPERLIPIDEMIA LDL GOAL <100: ICD-10-CM

## 2022-09-07 RX ORDER — ATORVASTATIN CALCIUM 20 MG/1
20 TABLET, FILM COATED ORAL DAILY
Qty: 30 TABLET | Refills: 2 | Status: SHIPPED | OUTPATIENT
Start: 2022-09-07 | End: 2022-12-06

## 2022-09-07 NOTE — TELEPHONE ENCOUNTER
"Former patient of Luis & has not established care with another provider.  Please assign refill request to covering provider per clinic standard process.    Last Written Prescription Date:  6/19/22  Last Fill Quantity: 30,  # refills: 2   Last office visit provider:  6/15/22     Requested Prescriptions   Pending Prescriptions Disp Refills     atorvastatin (LIPITOR) 20 MG tablet [Pharmacy Med Name: Atorvastatin Calcium 20MG TABS] 30 tablet 2     Sig: TAKE 1 TABLET (20 MG) BY MOUTH DAILY       Statins Protocol Passed - 9/6/2022 12:30 PM        Passed - LDL on file in past 12 months     Recent Labs   Lab Test 06/15/22  1445   *             Passed - No abnormal creatine kinase in past 12 months     No lab results found.             Passed - Recent (12 mo) or future (30 days) visit within the authorizing provider's specialty     Patient has had an office visit with the authorizing provider or a provider within the authorizing providers department within the previous 12 mos or has a future within next 30 days. See \"Patient Info\" tab in inbasket, or \"Choose Columns\" in Meds & Orders section of the refill encounter.              Passed - Medication is active on med list        Passed - Patient is age 18 or older             eRbeca Otero RN 09/06/22 7:39 PM  "

## 2022-09-10 NOTE — TELEPHONE ENCOUNTER
Called and left detail message.     
Please see orders requested below for HC.  Does PCP agree?  Thanks.   
Who is calling:    Reason for Call:  Requesting Janak Smith MD to follow patient in Home Care services- please return call to caller.   Date of last appointment with primary care: 4/11/17 caller stated she would encourage patient to schedule soon.  Has the patient been recently seen:  No  Okay to leave a detailed message: Yes      
ok  
4 = No assist / stand by assistance

## 2022-09-12 DIAGNOSIS — G40.909 SEIZURE DISORDER (H): ICD-10-CM

## 2022-09-12 NOTE — TELEPHONE ENCOUNTER
Routing refill request to provider for review/approval because:  Drug not on the [facility] refill protocol     Last Written Prescription Date:  6/22/22  Last Fill Quantity: 60,  # refills: 2   Last office visit provider:  6/15/22     Requested Prescriptions   Pending Prescriptions Disp Refills     levETIRAcetam (KEPPRA) 750 MG tablet [Pharmacy Med Name: levETIRAcetam 750MG TABS*] 60 tablet 2     Sig: TAKE 1 TABLET BY MOUTH TWICE A DAY       There is no refill protocol information for this order          Rebeca Otero, RN 09/12/22 2:31 PM

## 2022-09-15 RX ORDER — LEVETIRACETAM 750 MG/1
TABLET ORAL
Qty: 60 TABLET | Refills: 2 | Status: SHIPPED | OUTPATIENT
Start: 2022-09-15 | End: 2022-12-06

## 2022-10-10 DIAGNOSIS — Z76.0 ENCOUNTER FOR MEDICATION REFILL: Primary | ICD-10-CM

## 2022-10-10 DIAGNOSIS — I69.30 SEQUELA OF LACUNAR INFARCTION: ICD-10-CM

## 2022-10-10 NOTE — TELEPHONE ENCOUNTER
"Former patient of Luis & has not established care with another provider.  Please assign refill request to covering provider per clinic standard process.    Last Written Prescription Date:  10/8/21  Last Fill Quantity: 90,  # refills: 3   Last office visit provider:  6/15/22     Requested Prescriptions   Pending Prescriptions Disp Refills     ASPIRIN LOW DOSE 81 MG EC tablet [Pharmacy Med Name: Aspirin Low Dose 81MG TBEC] 30 tablet      Sig: TAKE 1 TABLET BY MOUTH DAILY       Analgesics (Non-Narcotic Tylenol and ASA Only) Passed - 10/10/2022 11:41 AM        Passed - Recent (12 mo) or future (30 days) visit within the authorizing provider's specialty     Patient has had an office visit with the authorizing provider or a provider within the authorizing providers department within the previous 12 mos or has a future within next 30 days. See \"Patient Info\" tab in inbasket, or \"Choose Columns\" in Meds & Orders section of the refill encounter.              Passed - Patient is age 20 years or older     If ASA is flagged for ages under 20 years old. Forward to provider for confirmation Ryes Syndrome is not a concern.              Passed - Medication is active on med list             Rebeca Otero, RN 10/10/22 5:13 PM  "

## 2022-10-11 RX ORDER — ASPIRIN 81 MG/1
TABLET, COATED ORAL
Qty: 90 TABLET | Refills: 3 | Status: SHIPPED | OUTPATIENT
Start: 2022-10-11 | End: 2023-09-11

## 2022-10-11 NOTE — TELEPHONE ENCOUNTER
Aspirin refills, however, patient will need to follow-up with provider in 2 months to review whether this medication is still indicated.  Please call patient to offer an appointment in 2 months.

## 2022-12-02 DIAGNOSIS — G40.909 SEIZURE DISORDER (H): ICD-10-CM

## 2022-12-02 DIAGNOSIS — E78.5 HYPERLIPIDEMIA LDL GOAL <100: ICD-10-CM

## 2022-12-05 NOTE — TELEPHONE ENCOUNTER
Former patient of Luis & has not established care with another provider.  Please assign refill request to covering provider per clinic standard process.    Routing refill request to provider for review/approval because:  Drug not on the [facility] refill protocol   No PCP    Last Written Prescription Date:  9/15/22  Last Fill Quantity: 60,  # refills: 2   Last office visit provider:  6/15/22     Requested Prescriptions   Pending Prescriptions Disp Refills     levETIRAcetam (KEPPRA) 750 MG tablet [Pharmacy Med Name: levETIRAcetam 750MG TABS*] 60 tablet 2     Sig: TAKE 1 TABLET BY MOUTH TWICE A DAY       There is no refill protocol information for this order          Binu Tillman RN 12/05/22 8:12 AM

## 2022-12-05 NOTE — TELEPHONE ENCOUNTER
"Former patient of Luis & has not established care with another provider.  Please assign refill request to covering provider per clinic standard process.    Routing refill request to provider for review/approval because:  No PCP    Last Written Prescription Date:  9/7/22  Last Fill Quantity: 30,  # refills: 2   Last office visit provider:  6/15/22     Requested Prescriptions   Pending Prescriptions Disp Refills     atorvastatin (LIPITOR) 20 MG tablet [Pharmacy Med Name: Atorvastatin Calcium 20MG TABS] 30 tablet 2     Sig: TAKE 1 TABLET (20 MG) BY MOUTH DAILY       Statins Protocol Passed - 12/5/2022  8:13 AM        Passed - LDL on file in past 12 months     Recent Labs   Lab Test 06/15/22  1445   *             Passed - No abnormal creatine kinase in past 12 months     No lab results found.             Passed - Recent (12 mo) or future (30 days) visit within the authorizing provider's specialty     Patient has had an office visit with the authorizing provider or a provider within the authorizing providers department within the previous 12 mos or has a future within next 30 days. See \"Patient Info\" tab in inbasket, or \"Choose Columns\" in Meds & Orders section of the refill encounter.              Passed - Medication is active on med list        Passed - Patient is age 18 or older             Binu Tillman RN 12/05/22 8:13 AM  "

## 2022-12-06 RX ORDER — LEVETIRACETAM 750 MG/1
TABLET ORAL
Qty: 60 TABLET | Refills: 2 | Status: SHIPPED | OUTPATIENT
Start: 2022-12-06 | End: 2023-01-31

## 2022-12-06 RX ORDER — ATORVASTATIN CALCIUM 20 MG/1
20 TABLET, FILM COATED ORAL DAILY
Qty: 90 TABLET | Refills: 0 | Status: SHIPPED | OUTPATIENT
Start: 2022-12-06 | End: 2023-01-31

## 2022-12-06 NOTE — TELEPHONE ENCOUNTER
Medication(s) refilled for 90 days.  Patient is due for annual visit.  Please call patient to schedule for further refills.

## 2022-12-12 NOTE — TELEPHONE ENCOUNTER
Future Appointments 12/12/2022 - 6/10/2023        Date Visit Type Length Department Provider     12/13/2022  9:30 AM OFFICE VISIT 30 min [facility] Rema Garza MD    Location Instructions:     [facility] is located at [address] [place] [address] in [place], at the intersection of [facility]. This is one block south of the [facility]. Free parking is available in the lot directly north of the clinic across [facility]. The clinic is near stops along bus [address].

## 2022-12-30 ENCOUNTER — OFFICE VISIT (OUTPATIENT)
Dept: FAMILY MEDICINE | Facility: CLINIC | Age: 67
End: 2022-12-30
Payer: COMMERCIAL

## 2022-12-30 VITALS
HEART RATE: 67 BPM | SYSTOLIC BLOOD PRESSURE: 145 MMHG | DIASTOLIC BLOOD PRESSURE: 80 MMHG | OXYGEN SATURATION: 98 % | TEMPERATURE: 97.5 F | BODY MASS INDEX: 33.2 KG/M2 | RESPIRATION RATE: 18 BRPM | WEIGHT: 212 LBS

## 2022-12-30 DIAGNOSIS — E78.5 HYPERLIPIDEMIA LDL GOAL <100: ICD-10-CM

## 2022-12-30 DIAGNOSIS — F17.200 NICOTINE USE DISORDER: ICD-10-CM

## 2022-12-30 DIAGNOSIS — S06.9X9S MAJOR NEUROCOGNITIVE DISORDER AS LATE EFFECT OF TRAUMATIC BRAIN INJURY WITH BEHAVIORAL DISTURBANCE (H): ICD-10-CM

## 2022-12-30 DIAGNOSIS — F10.91 ALCOHOL USE DISORDER IN REMISSION: ICD-10-CM

## 2022-12-30 DIAGNOSIS — F33.1 MAJOR DEPRESSIVE DISORDER, RECURRENT EPISODE, MODERATE (H): ICD-10-CM

## 2022-12-30 DIAGNOSIS — Z12.11 SCREEN FOR COLON CANCER: ICD-10-CM

## 2022-12-30 DIAGNOSIS — Z23 NEEDS FLU SHOT: ICD-10-CM

## 2022-12-30 DIAGNOSIS — I10 HYPERTENSION, UNSPECIFIED TYPE: Primary | ICD-10-CM

## 2022-12-30 DIAGNOSIS — Z13.1 SCREENING FOR DIABETES MELLITUS: ICD-10-CM

## 2022-12-30 DIAGNOSIS — F02.818 MAJOR NEUROCOGNITIVE DISORDER AS LATE EFFECT OF TRAUMATIC BRAIN INJURY WITH BEHAVIORAL DISTURBANCE (H): ICD-10-CM

## 2022-12-30 DIAGNOSIS — E44.1 PROTEIN-CALORIE MALNUTRITION, MILD (H): ICD-10-CM

## 2022-12-30 DIAGNOSIS — G40.909 SEIZURE DISORDER (H): ICD-10-CM

## 2022-12-30 PROCEDURE — 90662 IIV NO PRSV INCREASED AG IM: CPT | Performed by: STUDENT IN AN ORGANIZED HEALTH CARE EDUCATION/TRAINING PROGRAM

## 2022-12-30 PROCEDURE — 99214 OFFICE O/P EST MOD 30 MIN: CPT | Mod: 25 | Performed by: STUDENT IN AN ORGANIZED HEALTH CARE EDUCATION/TRAINING PROGRAM

## 2022-12-30 PROCEDURE — 90471 IMMUNIZATION ADMIN: CPT | Performed by: STUDENT IN AN ORGANIZED HEALTH CARE EDUCATION/TRAINING PROGRAM

## 2022-12-30 ASSESSMENT — PATIENT HEALTH QUESTIONNAIRE - PHQ9
SUM OF ALL RESPONSES TO PHQ QUESTIONS 1-9: 0
SUM OF ALL RESPONSES TO PHQ QUESTIONS 1-9: 0
10. IF YOU CHECKED OFF ANY PROBLEMS, HOW DIFFICULT HAVE THESE PROBLEMS MADE IT FOR YOU TO DO YOUR WORK, TAKE CARE OF THINGS AT HOME, OR GET ALONG WITH OTHER PEOPLE: NOT DIFFICULT AT ALL

## 2022-12-30 NOTE — PROGRESS NOTES
Assessment & Plan     Hypertension, unspecified type  Borderline controlled, OK considering pt's age.   - Continue Losartan 100 mg daily  - Comprehensive metabolic panel (BMP + Alb, Alk Phos, ALT, AST, Total. Bili, TP)  - Comprehensive metabolic panel (BMP + Alb, Alk Phos, ALT, AST, Total. Bili, TP)    Hyperlipidemia LDL goal <100  Recheck today.  - Continue atorvastatin 20 mg daily.  We will increase as needed based on lab today.  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 [facility] [phone].  -Continue current management  - Keppra 750 mg twice daily.    Major depressive disorder, recurrent episode, moderate (H)  Chronic, stable. Continue current management.   -Quetiapine 25 mg nightly    Alcohol use disorder in remission  History of alcohol abuse he will 5 years from his last drink of alcohol in July.    Protein-calorie malnutrition, mild (H)  Resolved.   - CMP today    Screen for colon cancer  - COLOGUARD([employer])    Nicotine use disorder  Smokes 6-7 per day - reports h/o smoking for 50 years 1 PPD.  - Declined pharm  - CT Chest Lung Cancer Scrn Low Dose wo    Needs flu shot  - INFLUENZA, QUAD, HIGH DOSE, PF, 65YR + (FLUZONE HD)    Screening for diabetes mellitus  - Hemoglobin A1c  - Hemoglobin A1c      Review of external notes as documented elsewhere in note  20 minutes spent on the date of the encounter doing chart review, history and exam, documentation and further activities per the note       Nicotine/Tobacco Cessation:  He reports that he has been smoking cigarettes. He has never used smokeless tobacco.  Nicotine/Tobacco Cessation Plan:   Information offered: Patient not interested at this time      BMI:   Estimated body mass index is  "33.2 kg/m  as calculated from the following:    Height as of 6/15/22: 1.702 m (5' 7.01\").    Weight as of this encounter: 96.2 kg (212 lb).       Return in about 4 weeks (around 1/27/2023) for Routine preventive, BP.    Rema Garza MD  [facility] BOBBY Schmid is a 67 year old accompanied by his self, presenting for the following health issues:  office visit      History of Present Illness       Reason for visit:  Meds check    He eats 2-3 servings of fruits and vegetables daily.He consumes 1 sweetened beverage(s) daily.He exercises with enough effort to increase his heart rate 60 or more minutes per day.  He exercises with enough effort to increase his heart rate 7 days per week.   He is taking medications regularly.    Today's PHQ-9         PHQ-9 Total Score: 0    PHQ-9 Q9 Thoughts of better off dead/self-harm past 2 weeks :   Not at all    How difficult have these problems made it for you to do your work, take care of things at home, or get along with other people: Not difficult at all         Flu today, decline shingles  Smokes 6-7 per day - 50 years 1 PPD  Declined pharm  [facility] [phone]         Review of Systems   Constitutional: Negative for fever and chills.   Respiratory: Negative for cough or shortness of breath.    Cardiovascular: Negative for chest pain or chest pressure.   Gastrointestinal: Negative for nausea, vomiting, diarrhea or abdominal pain.          Objective    BP (!) 145/80   Pulse 67   Temp 97.5  F (36.4  C) (Temporal)   Resp 18   Wt 96.2 kg (212 lb)   SpO2 98%   BMI 33.20 kg/m    Body mass index is 33.2 kg/m .  Physical Exam   General Appearance:  Alert, cooperative, no distress, appears stated age.  Head:  Normocephalic, without obvious abnormality, atraumatic.  Eyes:  Conjunctivae/corneas clear, extraocular movements intact both eyes.  Lungs:  Clear to auscultation bilaterally, respirations " unlabored.  Heart:  Regular rate and rhythm, S1 and S2 normal, no murmur, rub or gallop.  Abdomen:  Soft, non-tender, bowel sounds active all four quadrants.   Extremities:  Atraumatic, no cyanosis or edema.  Skin:  Skin color, texture, turgor normal, no rashes or lesions.  Neurologic: No focal deficits.

## 2023-01-10 PROBLEM — F10.20 ALCOHOLISM (H): Status: RESOLVED | Noted: 2019-08-27 | Resolved: 2023-01-10

## 2023-01-10 ASSESSMENT — PATIENT HEALTH QUESTIONNAIRE - PHQ9
10. IF YOU CHECKED OFF ANY PROBLEMS, HOW DIFFICULT HAVE THESE PROBLEMS MADE IT FOR YOU TO DO YOUR WORK, TAKE CARE OF THINGS AT HOME, OR GET ALONG WITH OTHER PEOPLE: NOT DIFFICULT AT ALL
SUM OF ALL RESPONSES TO PHQ QUESTIONS 1-9: 0

## 2023-01-11 ENCOUNTER — HOSPITAL ENCOUNTER (OUTPATIENT)
Dept: CT IMAGING | Facility: HOSPITAL | Age: 68
Discharge: HOME | End: 2023-01-11
Attending: STUDENT IN AN ORGANIZED HEALTH CARE EDUCATION/TRAINING PROGRAM | Admitting: STUDENT IN AN ORGANIZED HEALTH CARE EDUCATION/TRAINING PROGRAM
Payer: COMMERCIAL

## 2023-01-11 DIAGNOSIS — F17.200 NICOTINE USE DISORDER: ICD-10-CM

## 2023-01-11 PROCEDURE — 71271 CT THORAX LUNG CANCER SCR C-: CPT

## 2023-01-12 ENCOUNTER — TELEPHONE (OUTPATIENT)
Dept: FAMILY MEDICINE | Facility: CLINIC | Age: 68
End: 2023-01-12

## 2023-01-12 DIAGNOSIS — E53.8 VITAMIN B12 DEFICIENCY (NON ANEMIC): ICD-10-CM

## 2023-01-12 NOTE — TELEPHONE ENCOUNTER
----- Message from Rema Garza MD sent at 1/12/2023  8:54 AM CST -----  Please call patient to inform him that his lung CT scan did not show any signs of cancer.  We can repeat this in a year.

## 2023-01-12 NOTE — TELEPHONE ENCOUNTER
Medication Question or Refill    Contacts       Type Contact Phone/Fax    01/12/2023 02:28 PM CST Phone (Incoming) [place] (Other) [phone]          What medication are you calling about (include dose and sig)?: Vitamin B1    Controlled Substance Agreement on file:   CSA -- Patient Level:    CSA: None found at the patient level.       Who prescribed the medication?: PCP    Do you need a refill? Yes:     When did you use the medication last? Pt is completely out    Patient offered an appointment? No    Do you have any questions or concerns?  No    Preferred Pharmacy:   [facility] [place] [id number] - [place], [place] - [address] [place] [address]  Phone: [phone] Fax: [phone]      Okay to leave a detailed message?: Yes at Home number on file [phone] (home)

## 2023-01-23 ENCOUNTER — OFFICE VISIT (OUTPATIENT)
Dept: FAMILY MEDICINE | Facility: CLINIC | Age: 68
End: 2023-01-23
Payer: COMMERCIAL

## 2023-01-23 VITALS
HEART RATE: 72 BPM | RESPIRATION RATE: 16 BRPM | SYSTOLIC BLOOD PRESSURE: 156 MMHG | DIASTOLIC BLOOD PRESSURE: 82 MMHG | BODY MASS INDEX: 33.9 KG/M2 | TEMPERATURE: 99 F | WEIGHT: 216 LBS | HEIGHT: 67 IN | OXYGEN SATURATION: 99 %

## 2023-01-23 DIAGNOSIS — Z12.11 SCREEN FOR COLON CANCER: ICD-10-CM

## 2023-01-23 DIAGNOSIS — F33.1 MAJOR DEPRESSIVE DISORDER, RECURRENT EPISODE, MODERATE (H): ICD-10-CM

## 2023-01-23 DIAGNOSIS — G40.909 SEIZURE DISORDER (H): ICD-10-CM

## 2023-01-23 DIAGNOSIS — E44.1 PROTEIN-CALORIE MALNUTRITION, MILD (H): ICD-10-CM

## 2023-01-23 DIAGNOSIS — F02.818 MAJOR NEUROCOGNITIVE DISORDER AS LATE EFFECT OF TRAUMATIC BRAIN INJURY WITH BEHAVIORAL DISTURBANCE (H): ICD-10-CM

## 2023-01-23 DIAGNOSIS — Z11.4 SCREENING FOR HIV (HUMAN IMMUNODEFICIENCY VIRUS): ICD-10-CM
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
## 2023-01-23 DIAGNOSIS — S06.9X9S MAJOR NEUROCOGNITIVE DISORDER AS LATE EFFECT OF TRAUMATIC BRAIN INJURY WITH BEHAVIORAL DISTURBANCE (H): ICD-10-CM

## 2023-01-23 DIAGNOSIS — S70.01XA CONTUSION OF RIGHT HIP, INITIAL ENCOUNTER: ICD-10-CM

## 2023-01-23 PROCEDURE — 99214 OFFICE O/P EST MOD 30 MIN: CPT | Performed by: FAMILY MEDICINE

## 2023-01-23 RX ORDER — CYCLOBENZAPRINE HCL 5 MG
5 TABLET ORAL 2 TIMES DAILY PRN
Qty: 30 TABLET | Refills: 0 | Status: SHIPPED | OUTPATIENT
Start: 2023-01-23 | End: 2023-01-31
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
## 2023-01-25 PROBLEM — E44.1 PROTEIN-CALORIE MALNUTRITION, MILD (H): Status: ACTIVE | Noted: 2023-01-25

## 2023-01-31 ENCOUNTER — LAB (OUTPATIENT)
Dept: FAMILY MEDICINE | Facility: CLINIC | Age: 68
End: 2023-01-31

## 2023-01-31 ENCOUNTER — OFFICE VISIT (OUTPATIENT)
Dept: FAMILY MEDICINE | Facility: CLINIC | Age: 68
End: 2023-01-31
Payer: COMMERCIAL

## 2023-01-31 VITALS
OXYGEN SATURATION: 96 % | RESPIRATION RATE: 16 BRPM | HEIGHT: 67 IN | TEMPERATURE: 97.5 F | DIASTOLIC BLOOD PRESSURE: 80 MMHG | HEART RATE: 65 BPM | SYSTOLIC BLOOD PRESSURE: 138 MMHG | WEIGHT: 214 LBS | BODY MASS INDEX: 33.59 KG/M2

## 2023-01-31 DIAGNOSIS — S06.9X9S MAJOR NEUROCOGNITIVE DISORDER AS LATE EFFECT OF TRAUMATIC BRAIN INJURY WITH BEHAVIORAL DISTURBANCE (H): ICD-10-CM
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
## 2023-01-31 DIAGNOSIS — Z12.11 SCREEN FOR COLON CANCER: ICD-10-CM

## 2023-01-31 DIAGNOSIS — E78.5 HYPERLIPIDEMIA LDL GOAL <100: ICD-10-CM

## 2023-01-31 DIAGNOSIS — Z76.0 ENCOUNTER FOR MEDICATION REFILL: ICD-10-CM

## 2023-01-31 DIAGNOSIS — G40.909 SEIZURE DISORDER (H): ICD-10-CM

## 2023-01-31 DIAGNOSIS — S70.01XD CONTUSION OF RIGHT HIP, SUBSEQUENT ENCOUNTER: ICD-10-CM

## 2023-01-31 DIAGNOSIS — E53.8 VITAMIN B12 DEFICIENCY (NON ANEMIC): ICD-10-CM

## 2023-01-31 DIAGNOSIS — Z00.00 ENCOUNTER FOR MEDICARE ANNUAL WELLNESS EXAM: Primary | ICD-10-CM

## 2023-01-31 DIAGNOSIS — Z11.4 SCREENING FOR HIV (HUMAN IMMUNODEFICIENCY VIRUS): ICD-10-CM

## 2023-01-31 DIAGNOSIS — F02.818 MAJOR NEUROCOGNITIVE DISORDER AS LATE EFFECT OF TRAUMATIC BRAIN INJURY WITH BEHAVIORAL DISTURBANCE (H): ICD-10-CM

## 2023-01-31 DIAGNOSIS — I69.30 SEQUELA OF LACUNAR INFARCTION: ICD-10-CM

## 2023-01-31 DIAGNOSIS — Z13.6 ENCOUNTER FOR SCREENING FOR ABDOMINAL AORTIC ANEURYSM (AAA) IN PATIENT 50 YEARS OF AGE OR OLDER WITHOUT OTHER RISK FACTORS FOR AAA: ICD-10-CM

## 2023-01-31 LAB
ALBUMIN SERPL BCG-MCNC: 4.2 G/DL (ref 3.5–5.2)
ALP SERPL-CCNC: 91 U/L (ref 40–129)
ALT SERPL W P-5'-P-CCNC: 10 U/L (ref 10–50)
ANION GAP SERPL CALCULATED.3IONS-SCNC: 11 MMOL/L (ref 7–15)
AST SERPL W P-5'-P-CCNC: 39 U/L (ref 10–50)
BILIRUB SERPL-MCNC: 0.9 MG/DL
BUN SERPL-MCNC: 7.2 MG/DL (ref 8–23)
CALCIUM SERPL-MCNC: 8.8 MG/DL (ref 8.8–10.2)
CHLORIDE SERPL-SCNC: 100 MMOL/L (ref 98–107)
CHOLEST SERPL-MCNC: 162 MG/DL
CREAT SERPL-MCNC: 0.85 MG/DL (ref 0.67–1.17)
DEPRECATED HCO3 PLAS-SCNC: 24 MMOL/L (ref 22–29)
GFR SERPL CREATININE-BSD FRML MDRD: >90 ML/MIN/1.73M2
GLUCOSE SERPL-MCNC: 89 MG/DL (ref 70–99)
HBA1C MFR BLD: 5.4 % (ref 0–5.6)
HDLC SERPL-MCNC: 58 MG/DL
HIV 1+2 AB+HIV1 P24 AG SERPL QL IA: NONREACTIVE
LDLC SERPL CALC-MCNC: 87 MG/DL
NONHDLC SERPL-MCNC: 104 MG/DL
POTASSIUM SERPL-SCNC: 4.1 MMOL/L (ref 3.4–5.3)
PROT SERPL-MCNC: 7.9 G/DL (ref 6.4–8.3)
SODIUM SERPL-SCNC: 135 MMOL/L (ref 136–145)
TRIGL SERPL-MCNC: 84 MG/DL

## 2023-01-31 PROCEDURE — 36415 COLL VENOUS BLD VENIPUNCTURE: CPT | Performed by: STUDENT IN AN ORGANIZED HEALTH CARE EDUCATION/TRAINING PROGRAM

## 2023-01-31 PROCEDURE — 99397 PER PM REEVAL EST PAT 65+ YR: CPT | Performed by: STUDENT IN AN ORGANIZED HEALTH CARE EDUCATION/TRAINING PROGRAM

## 2023-01-31 PROCEDURE — 87389 HIV-1 AG W/HIV-1&-2 AB AG IA: CPT | Performed by: STUDENT IN AN ORGANIZED HEALTH CARE EDUCATION/TRAINING PROGRAM

## 2023-01-31 PROCEDURE — 83036 HEMOGLOBIN GLYCOSYLATED A1C: CPT | Performed by: STUDENT IN AN ORGANIZED HEALTH CARE EDUCATION/TRAINING PROGRAM

## 2023-01-31 PROCEDURE — 80061 LIPID PANEL: CPT | Performed by: STUDENT IN AN ORGANIZED HEALTH CARE EDUCATION/TRAINING PROGRAM

## 2023-01-31 PROCEDURE — 80053 COMPREHEN METABOLIC PANEL: CPT | Performed by: STUDENT IN AN ORGANIZED HEALTH CARE EDUCATION/TRAINING PROGRAM

## 2023-01-31 PROCEDURE — 99214 OFFICE O/P EST MOD 30 MIN: CPT | Mod: 25 | Performed by: STUDENT IN AN ORGANIZED HEALTH CARE EDUCATION/TRAINING PROGRAM

## 2023-01-31 RX ORDER — ATORVASTATIN CALCIUM 20 MG/1
20 TABLET, FILM COATED ORAL DAILY
Qty: 90 TABLET | Refills: 3 | Status: SHIPPED | OUTPATIENT
Start: 2023-01-31 | End: 2023-09-11

## 2023-01-31 RX ORDER — LEVETIRACETAM 750 MG/1
750 TABLET ORAL 2 TIMES DAILY
Qty: 180 TABLET | Refills: 3 | Status: SHIPPED | OUTPATIENT
Start: 2023-01-31 | End: 2023-09-11

## 2023-01-31 RX ORDER — CYCLOBENZAPRINE HCL 5 MG
5 TABLET ORAL 2 TIMES DAILY PRN
Qty: 30 TABLET | Refills: 3 | Status: SHIPPED | OUTPATIENT
Start: 2023-01-31 | End: 2023-09-11

## 2023-01-31 RX ORDER — QUETIAPINE FUMARATE 25 MG/1
TABLET, FILM COATED ORAL
Qty: 90 TABLET | Refills: 3 | Status: SHIPPED | OUTPATIENT
Start: 2023-01-31 | End: 2023-09-11

## 2023-01-31 ASSESSMENT — ENCOUNTER SYMPTOMS
FEVER: 0
DIZZINESS: 0
HEADACHES: 0
PALPITATIONS: 0
COUGH: 0
FREQUENCY: 0
MYALGIAS: 1
HEMATURIA: 0
DIARRHEA: 0
PARESTHESIAS: 0
CONSTIPATION: 0
EYE PAIN: 0
CHILLS: 0
HEMATOCHEZIA: 0
ABDOMINAL PAIN: 0
ARTHRALGIAS: 0
JOINT SWELLING: 0
NERVOUS/ANXIOUS: 1
NAUSEA: 0
SHORTNESS OF BREATH: 0
WEAKNESS: 0
SORE THROAT: 0
DYSURIA: 0
HEARTBURN: 0

## 2023-01-31 ASSESSMENT — ACTIVITIES OF DAILY LIVING (ADL): CURRENT_FUNCTION: NO ASSISTANCE NEEDED

## 2023-01-31 NOTE — PATIENT INSTRUCTIONS
Patient Education   Personalized Prevention Plan  You are due for the preventive services outlined below.  Your care team is available to assist you in scheduling these services.  If you have already completed any of these items, please share that information with your care team to update in your medical record.  Health Maintenance Due   Topic Date Due     Depression Action Plan  Never done     Colorectal Cancer Screening  Never done     HIV Screening  Never done     Zoster (Shingles) Vaccine (1 of 2) Never done     AORTIC ANEURYSM SCREENING (SYSTEM ASSIGNED)  06/11/2022     COVID-19 Vaccine (5 - Booster for Pfizer series) 08/10/2022        At your visit today, we discussed your risk for falls and preventive options.    Fall Prevention  Falls often occur due to slipping, tripping or losing your balance. Millions of people fall every year and injure themselves. Here are ways to reduce your risk of falling again.     Think about your fall, was there anything that caused your fall that can be fixed, removed, or replaced?    Make your home safe by keeping walkways clear of objects you may trip over, such as electric cords.    Use non-slip pads under rugs. Don't use area rugs or small throw rugs.    Use non-slip mats in bathtubs and showers.    Install handrails and lights on staircases. The handrails should be on both sides of the stairs.    Don't walk in poorly lit areas.    Don't stand on chairs or wobbly ladders.    Use caution when reaching overhead or looking upward. This position can cause a loss of balance.    Be sure your shoes fit properly, have non-slip bottoms and are in good condition.     Wear shoes both inside and out. Don't go barefoot or wear slippers.    Be cautious when going up and down stairs, curbs, and when walking on uneven sidewalks.    If your balance is poor, consider using a cane or walker.    If your fall was related to alcohol use, stop or limit alcohol intake.     If your fall was related  to use of sleeping medicines, talk to your healthcare provider about this. You may need to reduce your dosage at bedtime if you awaken during the night to go to the bathroom.      To reduce the need for nighttime bathroom trips:  ? Don't drink fluids for several hours before going to bed  ? Empty your bladder before going to bed  ? Men can keep a urinal at the bedside    Stay as active as you can. Balance, flexibility, strength, and endurance all come from exercise. They all play a role in preventing falls. Ask your healthcare provider which types of activity are right for you.    Get your vision checked on a regular basis.    If you have pets, know where they are before you stand up or walk so you don't trip over them.    Use night lights.    Go over all your medicines with a pharmacist or other healthcare provider to see if any of them could make you more likely to fall.  Durga last reviewed this educational content on 4/1/2018 2000-2021 [employer]. All rights reserved. This information is not intended as a substitute for professional medical care. Always follow your healthcare professional's instructions.

## 2023-01-31 NOTE — PROGRESS NOTES
"  SUBJECTIVE:   Binu Pineda is a 67 year old male who presents for Preventive Visit.    {Split Bill scripting  The purpose of this visit is to discuss your medical history and prevent health problems before you are sick. You may be responsible for a co-pay, coinsurance, or deductible if your visit today includes services such as checking on a sore throat, having an x-ray or lab test, or treating and evaluating a new or existing condition :186108  {Patient advised of split billing (Optional):805786}  Are you in the first 12 months of your Medicare Part B coverage?  { :254980::\"No\"}    Physical Health:    In general, how would you rate your overall physical health? { :828669}    Outside of work, how many days during the week do you exercise? { :030603}    Outside of work, approximately how many minutes a day do you exercise?{ :014323}    If you drink alcohol do you typically have >3 drinks per day or >7 drinks per week? { :311555}    Do you usually eat at least 4 servings of fruit and vegetables a day, include whole grains & fiber and avoid regularly eating high fat or \"junk\" foods? { :037065::\"Yes\"}    Do you have any problems taking medications regularly?  { :749724::\"No\"}    Do you have any side effects from medications? { :763141}    Needs assistance for the following daily activities: { :827768}    Which of the following safety concerns are present in your home?  { :331273::\"none identified\"}     Hearing impairment: { :895088}    In the past 6 months, have you been bothered by leaking of urine? { :966481}    Mental Health:    In general, how would you rate your overall mental or emotional health? { :298629}  PHQ-2 Score:      Do you feel safe in your environment? { :728032}    Have you ever done Advance Care Planning? (For example, a Health Directive, POLST, or a discussion with a medical provider or your loved ones about your wishes): { :751452}    Additional concerns to address?  {If YES, notify patient " "they may be responsible for a copay, coinsurance or deductible if the visit today includes services such as checking on a sore throat, having an x-ray or lab test, or treating and evaluating a new or existing condition :374826::\"No\"}    Fall risk:  { :769634}  {If any of the above assessments are answered yes, consider ordering appropriate referrals (Optional):540903::\"click delete button to remove this line now\"}  Cognitive Screening: { :066541}    {Do you have sleep apnea, excessive snoring or daytime drowsiness? (Optional):948586}    {Outside tests to abstract? :556359}    {additional problems to add (Optional):761006}    Reviewed and updated as needed this visit by clinical staff    Allergies  Meds              Reviewed and updated as needed this visit by Provider                 Social History     Tobacco Use     Smoking status: Every Day     Types: Cigarettes     Smokeless tobacco: Never   Substance Use Topics     Alcohol use: Not on file                           Current providers sharing in care for this patient include: {Rooming staff:  Please update Care Team in Rooming Activity, refresh this note and then delete this statement}  Patient Care Team:  Rema Garza MD as PCP - General (Family Medicine)  Rema Garza MD as Assigned PCP    The following health maintenance items are reviewed in Epic and correct as of today:  Health Maintenance   Topic Date Due     DEPRESSION ACTION PLAN  Never done     COLORECTAL CANCER SCREENING  Never done     HIV SCREENING  Never done     ZOSTER IMMUNIZATION (1 of 2) Never done     AORTIC ANEURYSM SCREENING (SYSTEM ASSIGNED)  06/11/2022     COVID-19 Vaccine (5 - Booster for Pfizer series) 08/10/2022     Pneumococcal Vaccine: 65+ Years (3 - PPSV23 if available, else PCV20) 06/15/2023     PHQ-9  07/10/2023     DTAP/TDAP/TD IMMUNIZATION (4 - Td or Tdap) 09/04/2023     LUNG CANCER SCREENING  01/11/2024     ANNUAL REVIEW OF HM ORDERS  01/23/2024     NICOTINE/TOBACCO CESSATION " "COUNSELING Q 1 YR  01/31/2024     MEDICARE ANNUAL WELLNESS VISIT  01/31/2024     FALL RISK ASSESSMENT  01/31/2024     ADVANCE CARE PLANNING  12/17/2025     LIPID  06/15/2027     INFLUENZA VACCINE  Completed     IPV IMMUNIZATION  Aged Out     MENINGITIS IMMUNIZATION  Aged Out     {Chronicprobdata (Optional):733494}  {Decision Support (Optional):175970}    ROS:  {ROS COMP:338089}    OBJECTIVE:   /80 (BP Location: Left arm, Patient Position: Sitting, Cuff Size: Adult Large)   Pulse 65   Temp 97.5  F (36.4  C) (Temporal)   Resp 16   Ht 1.69 m (5' 6.54\")   Wt 97.1 kg (214 lb)   SpO2 96%   BMI 33.99 kg/m   Estimated body mass index is 33.99 kg/m  as calculated from the following:    Height as of this encounter: 1.69 m (5' 6.54\").    Weight as of this encounter: 97.1 kg (214 lb).  EXAM:   {Exam :609954}    {Diagnostic Test Results (Optional):832466::\"Diagnostic Test Results:\",\"Labs reviewed in Epic\"}    ASSESSMENT / PLAN:   {Diag Picklist:153103}    {Patient advised of split billing (Optional):270769}    Lab Results   Component Value Date    HGB 13.5 06/15/2022    WBC 8.6 06/15/2022    MCV 94 06/15/2022    CR 0.71 06/15/2022    AST 26 11/09/2021    ALT 13 11/09/2021    GFRESTIMATED >90 06/15/2022    HCVAB Positive (A) 03/20/2013       Health Maintenance:    STD Screening: ***    Immunizations: Shingles/never done, COVID booster***    HIV screening ages 15-65 (USPSTF guidelines, CDC guidelines are 13-64): None on file. ***    Advance Directive: *** not on file. Information provided 1/31/2023***.  The 10-year ASCVD risk score (Martine DONNELLY, et al., 2019) is: 24.6%    Values used to calculate the score:      Age: 67 years      Sex: Male      Is Non- : No      Diabetic: No      Tobacco smoker: Yes      Systolic Blood Pressure: 138 mmHg      Is BP treated: Yes      HDL Cholesterol: 52 mg/dL        Total Cholesterol: 234 mg/dL: On atorvastatin 20 mg daily and baby aspirin daily.    Colorectal " "Cancer Screening - None on file.  Cologuard ordered 2022, pt reports ***.  No family hx. Routine screening.     Hep C screening 18-79: ***Postive antibody 3/20/2013.  Negative hep C RNA 2020.    AAA screening for men 65-75 with smoking hx: Due. ***    Lung Cancer Screening: Smokes 6-7 per day currently - reports h/o smoking for 50 years 1 PPD.  Low-dose CT lung screening done 2023 negative.  Next due 2024.    Prostate cancer with PSA (55 - 69 years, individualized decision). Discussed the potential benefits and harms of screening,  pt is willing to do the scan , will do the scan. ***    COUNSELING:  {Medicare Counselin}    Estimated body mass index is 33.99 kg/m  as calculated from the following:    Height as of this encounter: 1.69 m (5' 6.54\").    Weight as of this encounter: 97.1 kg (214 lb).    {Weight Management Plan (ACO) Complete if BMI is abnormal-  Ages 18-64  BMI >24.9.  Age 65+ with BMI <23 or >30 (Optional):353318}    He reports that he has been smoking cigarettes. He has never used smokeless tobacco.  Nicotine/Tobacco Cessation Plan:   {Nicotine/Tobacco Cessation Plan:239261}    Appropriate preventive services were discussed with this patient, including applicable screening as appropriate for cardiovascular disease, diabetes, osteopenia/osteoporosis, and glaucoma.  As appropriate for age/gender, discussed screening for colorectal cancer, prostate cancer, breast cancer, and cervical cancer. Checklist reviewing preventive services available has been given to the patient.    Reviewed patients plan of care and provided an AVS. The {CarePlan:669023} for Binu meets the Care Plan requirement. This Care Plan has been established and reviewed with the {PATIENT, FAMILY MEMBER, CAREGIVER:076417}.    Counseling Resources:  ATP IV Guidelines  Pooled Cohorts Equation Calculator  Breast Cancer Risk Calculator  BRCA-Related Cancer Risk Assessment: FHS-7 Tool  FRAX Risk Assessment  ICSI " Preventive Guidelines  Dietary Guidelines for Americans, 2010  USDA's MyPlate  ASA Prophylaxis  Lung CA Screening    Rema Garza MD  [facility]

## 2023-02-02 ENCOUNTER — TELEPHONE (OUTPATIENT)
Dept: FAMILY MEDICINE | Facility: CLINIC | Age: 68
End: 2023-02-02
Payer: COMMERCIAL

## 2023-02-02 NOTE — TELEPHONE ENCOUNTER
Called pt and relayed lab result. Pt verbalized understanding.    Jeremy Stephens Cem Say, BSN RN  [facility]      ----- Message from Rema Garza MD sent at 2/2/2023 10:41 AM CST -----  Please call patient to inform him that his cholesterol have significantly improved and are within normal range.  Please continue taking his cholesterol medication daily.  Screening for diabetes was negative.  His electrolytes, kidney function, liver function were all normal.

## 2023-02-03 ENCOUNTER — TELEPHONE (OUTPATIENT)
Dept: FAMILY MEDICINE | Facility: CLINIC | Age: 68
End: 2023-02-03
Payer: COMMERCIAL

## 2023-02-03 NOTE — TELEPHONE ENCOUNTER
----- Message from Rema Garza MD sent at 2/2/2023 11:26 AM CST -----  Please call patient and inform him that screening for HIV was negative.

## 2023-02-08 ENCOUNTER — HOSPITAL ENCOUNTER (OUTPATIENT)
Dept: ULTRASOUND IMAGING | Facility: HOSPITAL | Age: 68
Discharge: HOME | End: 2023-02-08
Attending: STUDENT IN AN ORGANIZED HEALTH CARE EDUCATION/TRAINING PROGRAM | Admitting: STUDENT IN AN ORGANIZED HEALTH CARE EDUCATION/TRAINING PROGRAM
Payer: COMMERCIAL

## 2023-02-08 DIAGNOSIS — Z13.6 ENCOUNTER FOR SCREENING FOR ABDOMINAL AORTIC ANEURYSM (AAA) IN PATIENT 50 YEARS OF AGE OR OLDER WITHOUT OTHER RISK FACTORS FOR AAA: ICD-10-CM

## 2023-02-08 PROCEDURE — 76775 US EXAM ABDO BACK WALL LIM: CPT

## 2023-07-03 DIAGNOSIS — Z76.0 ENCOUNTER FOR MEDICATION REFILL: ICD-10-CM

## 2023-07-03 DIAGNOSIS — I10 ESSENTIAL HYPERTENSION: ICD-10-CM

## 2023-07-03 RX ORDER — LOSARTAN POTASSIUM 100 MG/1
TABLET ORAL
Qty: 30 TABLET | OUTPATIENT
Start: 2023-07-03

## 2023-07-05 DIAGNOSIS — I10 ESSENTIAL HYPERTENSION: ICD-10-CM

## 2023-07-05 DIAGNOSIS — Z76.0 ENCOUNTER FOR MEDICATION REFILL: ICD-10-CM

## 2023-07-05 RX ORDER — LOSARTAN POTASSIUM 100 MG/1
TABLET ORAL
Qty: 30 TABLET | OUTPATIENT
Start: 2023-07-05

## 2023-07-07 DIAGNOSIS — E53.8 VITAMIN B12 DEFICIENCY (NON ANEMIC): ICD-10-CM

## 2023-07-08 NOTE — TELEPHONE ENCOUNTER
"Last Written Prescription Date:  1/12/23  Last Fill Quantity: 90,  # refills: 1   Last office visit provider:  131/23     Requested Prescriptions   Pending Prescriptions Disp Refills     vitamin B-12 (CYANOCOBALAMIN) 1000 MCG tablet [Pharmacy Med Name: VITAMIN B-12 1000MCG TAB] 30 tablet      Sig: TAKE 1 TABLET BY MOUTH DAILY       Vitamin Supplements (Adult) Protocol Passed - 7/7/2023  3:36 PM        Passed - High dose Vitamin D not ordered        Passed - Recent (12 mo) or future (30 days) visit within the authorizing provider's specialty     Patient has had an office visit with the authorizing provider or a provider within the authorizing providers department within the previous 12 mos or has a future within next 30 days. See \"Patient Info\" tab in inbasket, or \"Choose Columns\" in Meds & Orders section of the refill encounter.              Passed - Medication is active on med list             Rebeca Otero RN 07/08/23 12:22 PM  "

## 2023-07-11 DIAGNOSIS — Z76.0 ENCOUNTER FOR MEDICATION REFILL: ICD-10-CM

## 2023-07-11 DIAGNOSIS — I10 ESSENTIAL HYPERTENSION: ICD-10-CM

## 2023-07-11 RX ORDER — LOSARTAN POTASSIUM 100 MG/1
TABLET ORAL
Qty: 90 TABLET | Refills: 2 | Status: SHIPPED | OUTPATIENT
Start: 2023-07-11 | End: 2023-09-11

## 2023-07-11 NOTE — TELEPHONE ENCOUNTER
"Last Written Prescription Date:  8/9/22  Last Fill Quantity: 90,  # refills: 3   Last office visit provider:  1/31/23, Dr. Garza     Requested Prescriptions   Pending Prescriptions Disp Refills     losartan (COZAAR) 100 MG tablet [Pharmacy Med Name: Losartan Potassium 100MG TABS] 30 tablet      Sig: TAKE 1 TABLET BY MOUTH DAILY       Angiotensin-II Receptors Passed - 7/11/2023 11:57 AM        Passed - Last blood pressure under 140/90 in past 12 months     BP Readings from Last 3 Encounters:   01/31/23 138/80   01/23/23 (!) 156/82   12/30/22 (!) 145/80                 Passed - Recent (12 mo) or future (30 days) visit within the authorizing provider's specialty     Patient has had an office visit with the authorizing provider or a provider within the authorizing providers department within the previous 12 mos or has a future within next 30 days. See \"Patient Info\" tab in inbasket, or \"Choose Columns\" in Meds & Orders section of the refill encounter.              Passed - Medication is active on med list        Passed - Patient is age 18 or older        Passed - Normal serum creatinine on file in past 12 months     Recent Labs   Lab Test 01/31/23  1216   CR 0.85       Ok to refill medication if creatinine is low          Passed - Normal serum potassium on file in past 12 months     Recent Labs   Lab Test 01/31/23  1216   POTASSIUM 4.1                         Radha Moore RN 07/11/23 2:59 PM  "

## 2023-08-22 ENCOUNTER — TELEPHONE (OUTPATIENT)
Dept: FAMILY MEDICINE | Facility: CLINIC | Age: 68
End: 2023-08-22
Payer: COMMERCIAL

## 2023-08-22 NOTE — TELEPHONE ENCOUNTER
Patient Quality Outreach    Patient is due for the following:   Colon Cancer Screening  Depression  -  PHQ-9 needed      Topic Date Due    Zoster (Shingles) Vaccine (1 of 2) Never done    COVID-19 Vaccine (5 - Pfizer series) 08/10/2022    Pneumococcal Vaccine (3 - PPSV23 if available, else PCV20) 06/15/2023    Diptheria Tetanus Pertussis (DTAP/TDAP/TD) Vaccine (4 - Td or Tdap) 09/04/2023       Next Steps:   Patient has upcoming appointment, these items will be addressed at that time.    Type of outreach:    Chart review performed, no outreach needed.      Questions for provider review:    None           Jeremy Todd MA

## 2023-09-11 ENCOUNTER — LAB (OUTPATIENT)
Dept: FAMILY MEDICINE | Facility: CLINIC | Age: 68
End: 2023-09-11

## 2023-09-11 ENCOUNTER — OFFICE VISIT (OUTPATIENT)
Dept: FAMILY MEDICINE | Facility: CLINIC | Age: 68
End: 2023-09-11
Payer: COMMERCIAL

## 2023-09-11 VITALS
BODY MASS INDEX: 31.55 KG/M2 | RESPIRATION RATE: 16 BRPM | SYSTOLIC BLOOD PRESSURE: 135 MMHG | HEIGHT: 67 IN | TEMPERATURE: 98.6 F | DIASTOLIC BLOOD PRESSURE: 74 MMHG | OXYGEN SATURATION: 95 % | WEIGHT: 201 LBS | HEART RATE: 68 BPM

## 2023-09-11 DIAGNOSIS — F02.818 MAJOR NEUROCOGNITIVE DISORDER AS LATE EFFECT OF TRAUMATIC BRAIN INJURY WITH BEHAVIORAL DISTURBANCE (H): ICD-10-CM

## 2023-09-11 DIAGNOSIS — Z12.11 SCREEN FOR COLON CANCER: ICD-10-CM

## 2023-09-11 DIAGNOSIS — E78.5 HYPERLIPIDEMIA LDL GOAL <100: ICD-10-CM

## 2023-09-11 DIAGNOSIS — Z23 NEED FOR SHINGLES VACCINE: ICD-10-CM

## 2023-09-11 DIAGNOSIS — G40.909 SEIZURE DISORDER (H): ICD-10-CM

## 2023-09-11 DIAGNOSIS — Z23 NEED FOR COVID-19 VACCINE: ICD-10-CM

## 2023-09-11 DIAGNOSIS — E53.8 VITAMIN B12 DEFICIENCY (NON ANEMIC): ICD-10-CM

## 2023-09-11 DIAGNOSIS — Z23 NEEDS FLU SHOT: ICD-10-CM

## 2023-09-11 DIAGNOSIS — I69.30 SEQUELA OF LACUNAR INFARCTION: ICD-10-CM

## 2023-09-11 DIAGNOSIS — I10 ESSENTIAL HYPERTENSION: ICD-10-CM

## 2023-09-11 DIAGNOSIS — Z76.0 ENCOUNTER FOR MEDICATION REFILL: Primary | ICD-10-CM

## 2023-09-11 DIAGNOSIS — Z23 NEED FOR PROPHYLACTIC VACCINATION AGAINST STREPTOCOCCUS PNEUMONIAE (PNEUMOCOCCUS): ICD-10-CM

## 2023-09-11 DIAGNOSIS — S06.9X9S MAJOR NEUROCOGNITIVE DISORDER AS LATE EFFECT OF TRAUMATIC BRAIN INJURY WITH BEHAVIORAL DISTURBANCE (H): ICD-10-CM

## 2023-09-11 PROBLEM — E44.1 PROTEIN-CALORIE MALNUTRITION, MILD (H): Status: RESOLVED | Noted: 2023-01-25 | Resolved: 2023-09-11

## 2023-09-11 PROCEDURE — 36415 COLL VENOUS BLD VENIPUNCTURE: CPT | Performed by: STUDENT IN AN ORGANIZED HEALTH CARE EDUCATION/TRAINING PROGRAM

## 2023-09-11 PROCEDURE — 91312 COVID-19 BIVALENT 12+ (PFIZER): CPT | Performed by: STUDENT IN AN ORGANIZED HEALTH CARE EDUCATION/TRAINING PROGRAM

## 2023-09-11 PROCEDURE — 0121A COVID-19 BIVALENT 12+ (PFIZER): CPT | Performed by: STUDENT IN AN ORGANIZED HEALTH CARE EDUCATION/TRAINING PROGRAM

## 2023-09-11 PROCEDURE — 82607 VITAMIN B-12: CPT | Performed by: STUDENT IN AN ORGANIZED HEALTH CARE EDUCATION/TRAINING PROGRAM

## 2023-09-11 PROCEDURE — 90677 PCV20 VACCINE IM: CPT | Performed by: STUDENT IN AN ORGANIZED HEALTH CARE EDUCATION/TRAINING PROGRAM

## 2023-09-11 PROCEDURE — 90471 IMMUNIZATION ADMIN: CPT | Performed by: STUDENT IN AN ORGANIZED HEALTH CARE EDUCATION/TRAINING PROGRAM

## 2023-09-11 PROCEDURE — 99214 OFFICE O/P EST MOD 30 MIN: CPT | Mod: 25 | Performed by: STUDENT IN AN ORGANIZED HEALTH CARE EDUCATION/TRAINING PROGRAM

## 2023-09-11 PROCEDURE — 90472 IMMUNIZATION ADMIN EACH ADD: CPT | Performed by: STUDENT IN AN ORGANIZED HEALTH CARE EDUCATION/TRAINING PROGRAM

## 2023-09-11 PROCEDURE — 90750 HZV VACC RECOMBINANT IM: CPT | Performed by: STUDENT IN AN ORGANIZED HEALTH CARE EDUCATION/TRAINING PROGRAM

## 2023-09-11 RX ORDER — LEVETIRACETAM 750 MG/1
750 TABLET ORAL 2 TIMES DAILY
Qty: 180 TABLET | Refills: 3 | Status: SHIPPED | OUTPATIENT
Start: 2023-09-11 | End: 2024-09-23

## 2023-09-11 RX ORDER — QUETIAPINE FUMARATE 25 MG/1
TABLET, FILM COATED ORAL
Qty: 90 TABLET | Refills: 3 | Status: SHIPPED | OUTPATIENT
Start: 2023-09-11 | End: 2024-08-26

## 2023-09-11 RX ORDER — CYCLOBENZAPRINE HCL 5 MG
5 TABLET ORAL 2 TIMES DAILY PRN
Qty: 30 TABLET | Refills: 3 | Status: CANCELLED | OUTPATIENT
Start: 2023-09-11

## 2023-09-11 RX ORDER — LOSARTAN POTASSIUM 100 MG/1
100 TABLET ORAL DAILY
Qty: 90 TABLET | Refills: 3 | Status: SHIPPED | OUTPATIENT
Start: 2023-09-11 | End: 2024-09-23

## 2023-09-11 RX ORDER — ATORVASTATIN CALCIUM 20 MG/1
20 TABLET, FILM COATED ORAL DAILY
Qty: 90 TABLET | Refills: 3 | Status: SHIPPED | OUTPATIENT
Start: 2023-09-11 | End: 2024-09-23

## 2023-09-11 RX ORDER — ASPIRIN 81 MG/1
81 TABLET ORAL DAILY
Qty: 90 TABLET | Refills: 3 | Status: SHIPPED | OUTPATIENT
Start: 2023-09-11 | End: 2024-09-23

## 2023-09-11 NOTE — PROGRESS NOTES
Sequela of lacunar infarction  Hyperlipidemia LDL goal <100  Chronic, stable. Continue current meds.   -     atorvastatin (LIPITOR) 20 MG tablet; Take 1 tablet (20 mg) by mouth daily     Fall  Contusion of right hip, subsequent encounter  Improved. Requested refill of flexeril prn.   -     cyclobenzaprine (FLEXERIL) 5 MG tablet; Take 1 tablet (5 mg) by mouth 2 times daily as needed for muscle spasms     Seizure disorder (H)  Chronic, stable. Continue current management.   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 Continue current management.   -     QUEtiapine (SEROQUEL) 25 MG tablet; [QUETIAPINE (SEROQUEL) 25 MG TABLET] TAKE 1 TABLET BY MOUTH EVERY NIGHT AT BEDTIME     Vitamin B12 deficiency (non anemic)  Chronic, stable. Continue current management.      Screen for colon cancer  -     COLOGBAIRON(EXACT SCIENCES); Future     Screening for HIV (human immunodeficiency virus)  -     HIV Antigen Antibody Combo; Future  -     HIV Antigen Antibody Combo     Encounter for screening for abdominal aortic aneurysm (AAA) in patient 50 years of age or older without other risk factors for AAA  -     US Abdominal Aorta Imaging; Future

## 2023-09-11 NOTE — PROGRESS NOTES
Assessment & Plan     Encounter for medication refill  Discontinue cyclobenzaprine given patient's hip contusion from fall has resolved.    Essential hypertension  Chronic, well controlled.  Continue current management.  - losartan (COZAAR) 100 MG tablet  Dispense: 90 tablet; Refill: 3    Hyperlipidemia LDL goal <100  Nicotine use disorder  Chronic, well controlled.  Discussed ASCVD risk of 18%.  Discussed importance of smoking cessation.  Patient was agreeable.  Continue current management  - atorvastatin (LIPITOR) 20 MG tablet  Dispense: 90 tablet; Refill: 3  -Declined pharmacology referral to [place] quit partner for smoking cessation.  Feels that he will be able to quit on his own.  Has decreased his smoking to 10 cigarettes/day.    Sequela of lacunar infarction  Stable.  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  Refill medication.  Continue current management.  - QUEtiapine (SEROQUEL) 25 MG tablet  Dispense: 90 tablet; Refill: 3    Seizure disorder (H)  Chronic, stable.  Continue current management.  - levETIRAcetam (KEPPRA) 750 MG tablet  Dispense: 180 tablet; Refill: 3    Vitamin B12 deficiency (non anemic)  Recheck levels today.  Continue current management for now.  - vitamin B-12 (CYANOCOBALAMIN) 1000 MCG tablet  Dispense: 90 tablet; Refill: 3  - Vitamin B12    Needs flu shot  Discussed, patient declined.    Need for COVID-19 vaccine  - COVID-19 BIVALENT 12+ (PFIZER)    Need for shingles vaccine  - ZOSTER RECOMBINANT ADJUVANTED (SHINGRIX)    Need for prophylactic vaccination against Streptococcus pneumoniae (pneumococcus)  - Pneumococcal 20 Valent Conjugate (PCV20)    Screen for colon cancer  Patient reports he completed Cologuard, however was confused about where to send it.  Does not appear that results were finalized.  Agreeable with reorder  "today.  - [employer])      Review of external notes as documented elsewhere in note       BMI:   Estimated body mass index is 31.92 kg/m  as calculated from the following:    Height as of this encounter: 1.69 m (5' 6.54\").    Weight as of this encounter: 91.2 kg (201 lb).   Weight management plan: Discussed healthy diet and exercise guidelines        Rema Garza MD  [facility]    Casie Schmid is a 68 year old, presenting for the following health issues:  office visi (Follow up)        9/11/2023     4:04 PM   Additional Questions   Roomed by eh   Accompanied by provider       History of Present Illness       Reason for visit:  Pcp follow up    He eats 2-3 servings of fruits and vegetables daily.He consumes 3 sweetened beverage(s) daily.He exercises with enough effort to increase his heart rate 60 or more minutes per day.  He exercises with enough effort to increase his heart rate 7 days per week.   He is taking medications regularly.     Sequela of lacunar infarction  Hyperlipidemia LDL goal <100  The 10-year ASCVD risk score (Martine DONNELLY, et al., 2019) is: 18.7%    Values used to calculate the score:      Age: 68 years      Sex: Male      Is Non- : No      Diabetic: No      Tobacco smoker: Yes      Systolic Blood Pressure: 135 mmHg      Is BP treated: Yes      HDL Cholesterol: 58 mg/dL      Total Cholesterol: 162 mg/dL  - smokes 10 per day. Declined pharm. Declined [place] [facility].      Fall  Contusion of right hip, subsequent encounter  -Resolved has not had to use Flexeril since a few days after fall.             Review of Systems   Constitutional: Negative for fever and chills.   Respiratory: Negative for cough or shortness of breath.    Cardiovascular: Negative for chest pain or chest pressure.   Gastrointestinal: Negative for nausea, vomiting, diarrhea or abdominal pain.        Objective    /74 (BP Location: Left arm, Patient Position: " "Sitting, Cuff Size: Adult Regular)   Pulse 68   Temp 98.6  F (37  C) (Temporal)   Resp 16   Ht 1.69 m (5' 6.54\")   Wt 91.2 kg (201 lb)   SpO2 95%   BMI 31.92 kg/m    Body mass index is 31.92 kg/m .  Physical Exam   General Appearance:  Alert, cooperative, no distress, appears stated age.  Head:  Normocephalic, without obvious abnormality, atraumatic.  Eyes:  Conjunctivae/corneas clear, extraocular movements intact both eyes.  Lungs:  Clear to auscultation bilaterally, respirations unlabored.  Heart:  Regular rate and rhythm, S1 and S2 normal, no murmur, rub or gallop.  Abdomen:  Soft, non-tender, bowel sounds active all four quadrants.   Extremities:  Atraumatic, no cyanosis or edema.  Skin:  Skin color, texture, turgor normal, no rashes or lesions.  Neurologic: No focal deficits.    Prior to immunization administration, verified patients identity using patient s name and date of birth. Please see Immunization Activity for additional information.     Screening Questionnaire for Adult Immunization    Are you sick today?   No   Do you have allergies to medications, food, a vaccine component or latex?   No   Have you ever had a serious reaction after receiving a vaccination?   No   Do you have a long-term health problem with heart, lung, kidney, or metabolic disease (e.g., diabetes), asthma, a blood disorder, no spleen, complement component deficiency, a cochlear implant, or a spinal fluid leak?  Are you on long-term aspirin therapy?   No   Do you have cancer, leukemia, HIV/AIDS, or any other immune system problem?   No   Do you have a parent, brother, or sister with an immune system problem?   No   In the past 3 months, have you taken medications that affect  your immune system, such as prednisone, other steroids, or anticancer drugs; drugs for the treatment of rheumatoid arthritis, Crohn s disease, or psoriasis; or have you had radiation treatments?   No   Have you had a seizure, or a brain or other nervous " system problem?   No   During the past year, have you received a transfusion of blood or blood    products, or been given immune (gamma) globulin or antiviral drug?   No   For women: Are you pregnant or is there a chance you could become       pregnant during the next month?   No   Have you received any vaccinations in the past 4 weeks?   No     Immunization questionnaire answers were all negative.      Patient instructed to remain in clinic for 15 minutes afterwards, and to report any adverse reactions.     Screening performed by Jeremy Todd MA on 9/11/2023 at 4:10 PM.

## 2023-09-12 ENCOUNTER — TELEPHONE (OUTPATIENT)
Dept: FAMILY MEDICINE | Facility: CLINIC | Age: 68
End: 2023-09-12
Payer: COMMERCIAL

## 2023-09-12 LAB — VIT B12 SERPL-MCNC: 918 PG/ML (ref 232–1245)

## 2023-09-12 NOTE — TELEPHONE ENCOUNTER
----- Message from Rema Garza MD sent at 9/12/2023 11:13 AM CDT -----  Abdomen B12 is within normal range.  You can continue vitamin B12 supplements for now.

## 2024-05-31 ENCOUNTER — TELEPHONE (OUTPATIENT)
Dept: FAMILY MEDICINE | Facility: CLINIC | Age: 69
End: 2024-05-31
Payer: COMMERCIAL

## 2024-05-31 NOTE — TELEPHONE ENCOUNTER
Forms/Letter Request    Type of form/letter: OTHER: ICD-10 request       Do we have the form/letter: Yes: placed in PCP inbox    Who is the form from? [employer]  (if other please explain)    Where did/will the form come from? form was faxed in    When is form/letter needed by: Asap    How would you like the form/letter returned: Fax : [phone]

## 2024-09-23 DIAGNOSIS — G40.909 SEIZURE DISORDER (H): ICD-10-CM

## 2024-09-23 DIAGNOSIS — E78.5 HYPERLIPIDEMIA LDL GOAL <100: ICD-10-CM

## 2024-09-23 DIAGNOSIS — Z76.0 ENCOUNTER FOR MEDICATION REFILL: ICD-10-CM

## 2024-09-23 DIAGNOSIS — E53.8 VITAMIN B12 DEFICIENCY (NON ANEMIC): ICD-10-CM

## 2024-09-23 DIAGNOSIS — I69.30 SEQUELA OF LACUNAR INFARCTION: ICD-10-CM

## 2024-09-23 DIAGNOSIS — I10 ESSENTIAL HYPERTENSION: ICD-10-CM

## 2024-09-23 RX ORDER — ASPIRIN 81 MG/1
81 TABLET, COATED ORAL DAILY
Qty: 30 TABLET | Refills: 0 | Status: SHIPPED | OUTPATIENT
Start: 2024-09-23 | End: 2024-10-07

## 2024-09-23 RX ORDER — LOSARTAN POTASSIUM 100 MG/1
100 TABLET ORAL DAILY
Qty: 90 TABLET | Refills: 0 | Status: SHIPPED | OUTPATIENT
Start: 2024-09-23 | End: 2024-10-07

## 2024-09-23 RX ORDER — LEVETIRACETAM 750 MG/1
750 TABLET ORAL 2 TIMES DAILY
Qty: 180 TABLET | Refills: 0 | Status: SHIPPED | OUTPATIENT
Start: 2024-09-23 | End: 2024-10-07

## 2024-09-23 RX ORDER — ATORVASTATIN CALCIUM 20 MG/1
20 TABLET, FILM COATED ORAL DAILY
Qty: 90 TABLET | Refills: 0 | Status: SHIPPED | OUTPATIENT
Start: 2024-09-23 | End: 2024-10-07

## 2024-09-23 RX ORDER — LANOLIN ALCOHOL/MO/W.PET/CERES
1000 CREAM (GRAM) TOPICAL DAILY
Qty: 30 TABLET | Refills: 0 | Status: SHIPPED | OUTPATIENT
Start: 2024-09-23 | End: 2024-10-07

## 2024-10-07 ENCOUNTER — MEDICAL CORRESPONDENCE (OUTPATIENT)
Dept: HEALTH INFORMATION MANAGEMENT | Facility: CLINIC | Age: 69
End: 2024-10-07

## 2024-10-07 ENCOUNTER — OFFICE VISIT (OUTPATIENT)
Dept: FAMILY MEDICINE | Facility: CLINIC | Age: 69
End: 2024-10-07

## 2024-10-07 VITALS
BODY MASS INDEX: 30.16 KG/M2 | RESPIRATION RATE: 16 BRPM | DIASTOLIC BLOOD PRESSURE: 72 MMHG | OXYGEN SATURATION: 99 % | TEMPERATURE: 97.5 F | HEART RATE: 59 BPM | HEIGHT: 68 IN | WEIGHT: 199 LBS | SYSTOLIC BLOOD PRESSURE: 132 MMHG

## 2024-10-07 DIAGNOSIS — Z00.00 ROUTINE GENERAL MEDICAL EXAMINATION AT A HEALTH CARE FACILITY: Primary | ICD-10-CM

## 2024-10-07 DIAGNOSIS — I10 ESSENTIAL HYPERTENSION: ICD-10-CM

## 2024-10-07 DIAGNOSIS — G40.909 SEIZURE DISORDER (H): ICD-10-CM

## 2024-10-07 DIAGNOSIS — Z23 NEED FOR COVID-19 VACCINE: ICD-10-CM

## 2024-10-07 DIAGNOSIS — E78.5 HYPERLIPIDEMIA LDL GOAL <100: ICD-10-CM

## 2024-10-07 DIAGNOSIS — E78.5 HYPERLIPIDEMIA, UNSPECIFIED HYPERLIPIDEMIA TYPE: ICD-10-CM

## 2024-10-07 DIAGNOSIS — E87.1 HYPONATREMIA: ICD-10-CM

## 2024-10-07 DIAGNOSIS — S06.9X9S MAJOR NEUROCOGNITIVE DISORDER AS LATE EFFECT OF TRAUMATIC BRAIN INJURY WITH BEHAVIORAL DISTURBANCE (H): ICD-10-CM

## 2024-10-07 DIAGNOSIS — Z23 NEEDS FLU SHOT: ICD-10-CM

## 2024-10-07 DIAGNOSIS — Z23 NEED FOR DIPHTHERIA-TETANUS-PERTUSSIS (TDAP) VACCINE: ICD-10-CM

## 2024-10-07 DIAGNOSIS — Z23 NEED FOR SHINGLES VACCINE: ICD-10-CM

## 2024-10-07 DIAGNOSIS — F02.818 MAJOR NEUROCOGNITIVE DISORDER AS LATE EFFECT OF TRAUMATIC BRAIN INJURY WITH BEHAVIORAL DISTURBANCE (H): ICD-10-CM

## 2024-10-07 DIAGNOSIS — Z12.11 SCREEN FOR COLON CANCER: ICD-10-CM

## 2024-10-07 DIAGNOSIS — E53.8 VITAMIN B12 DEFICIENCY (NON ANEMIC): ICD-10-CM

## 2024-10-07 DIAGNOSIS — Z13.1 SCREENING FOR DIABETES MELLITUS: ICD-10-CM

## 2024-10-07 DIAGNOSIS — I69.30 SEQUELA OF LACUNAR INFARCTION: ICD-10-CM

## 2024-10-07 DIAGNOSIS — Z76.0 ENCOUNTER FOR MEDICATION REFILL: ICD-10-CM

## 2024-10-07 DIAGNOSIS — F32.5 DEPRESSION, MAJOR, IN REMISSION (H): ICD-10-CM

## 2024-10-07 DIAGNOSIS — F17.210 CIGARETTE NICOTINE DEPENDENCE WITHOUT COMPLICATION: ICD-10-CM

## 2024-10-07 PROBLEM — Z87.820 HISTORY OF TRAUMATIC BRAIN INJURY: Status: ACTIVE | Noted: 2019-01-22

## 2024-10-07 LAB
EST. AVERAGE GLUCOSE BLD GHB EST-MCNC: 114 MG/DL
HBA1C MFR BLD: 5.6 % (ref 0–5.6)

## 2024-10-07 PROCEDURE — 99214 OFFICE O/P EST MOD 30 MIN: CPT | Mod: 25 | Performed by: STUDENT IN AN ORGANIZED HEALTH CARE EDUCATION/TRAINING PROGRAM

## 2024-10-07 PROCEDURE — 99397 PER PM REEVAL EST PAT 65+ YR: CPT | Performed by: STUDENT IN AN ORGANIZED HEALTH CARE EDUCATION/TRAINING PROGRAM

## 2024-10-07 PROCEDURE — 83036 HEMOGLOBIN GLYCOSYLATED A1C: CPT | Performed by: STUDENT IN AN ORGANIZED HEALTH CARE EDUCATION/TRAINING PROGRAM

## 2024-10-07 PROCEDURE — 90662 IIV NO PRSV INCREASED AG IM: CPT | Performed by: STUDENT IN AN ORGANIZED HEALTH CARE EDUCATION/TRAINING PROGRAM

## 2024-10-07 PROCEDURE — 90715 TDAP VACCINE 7 YRS/> IM: CPT | Performed by: STUDENT IN AN ORGANIZED HEALTH CARE EDUCATION/TRAINING PROGRAM

## 2024-10-07 PROCEDURE — 90472 IMMUNIZATION ADMIN EACH ADD: CPT | Performed by: STUDENT IN AN ORGANIZED HEALTH CARE EDUCATION/TRAINING PROGRAM

## 2024-10-07 PROCEDURE — 80061 LIPID PANEL: CPT | Performed by: STUDENT IN AN ORGANIZED HEALTH CARE EDUCATION/TRAINING PROGRAM

## 2024-10-07 PROCEDURE — 90471 IMMUNIZATION ADMIN: CPT | Performed by: STUDENT IN AN ORGANIZED HEALTH CARE EDUCATION/TRAINING PROGRAM

## 2024-10-07 PROCEDURE — 91320 SARSCV2 VAC 30MCG TRS-SUC IM: CPT | Performed by: STUDENT IN AN ORGANIZED HEALTH CARE EDUCATION/TRAINING PROGRAM

## 2024-10-07 PROCEDURE — 36415 COLL VENOUS BLD VENIPUNCTURE: CPT | Performed by: STUDENT IN AN ORGANIZED HEALTH CARE EDUCATION/TRAINING PROGRAM

## 2024-10-07 PROCEDURE — 80053 COMPREHEN METABOLIC PANEL: CPT | Performed by: STUDENT IN AN ORGANIZED HEALTH CARE EDUCATION/TRAINING PROGRAM

## 2024-10-07 PROCEDURE — 90750 HZV VACC RECOMBINANT IM: CPT | Performed by: STUDENT IN AN ORGANIZED HEALTH CARE EDUCATION/TRAINING PROGRAM

## 2024-10-07 PROCEDURE — 90480 ADMN SARSCOV2 VAC 1/ONLY CMP: CPT | Performed by: STUDENT IN AN ORGANIZED HEALTH CARE EDUCATION/TRAINING PROGRAM

## 2024-10-07 RX ORDER — QUETIAPINE FUMARATE 25 MG/1
TABLET, FILM COATED ORAL
Qty: 90 TABLET | Refills: 3 | Status: CANCELLED | OUTPATIENT
Start: 2024-10-07

## 2024-10-07 RX ORDER — LANOLIN ALCOHOL/MO/W.PET/CERES
1000 CREAM (GRAM) TOPICAL DAILY
Qty: 30 TABLET | Refills: 11 | Status: SHIPPED | OUTPATIENT
Start: 2024-10-07

## 2024-10-07 RX ORDER — ASPIRIN 81 MG/1
81 TABLET ORAL DAILY
Qty: 30 TABLET | Refills: 11 | Status: SHIPPED | OUTPATIENT
Start: 2024-10-07

## 2024-10-07 RX ORDER — QUETIAPINE FUMARATE 25 MG/1
TABLET, FILM COATED ORAL
Qty: 30 TABLET | Refills: 11 | Status: SHIPPED | OUTPATIENT
Start: 2024-10-07

## 2024-10-07 RX ORDER — ATORVASTATIN CALCIUM 20 MG/1
20 TABLET, FILM COATED ORAL DAILY
Qty: 30 TABLET | Refills: 11 | Status: SHIPPED | OUTPATIENT
Start: 2024-10-07

## 2024-10-07 RX ORDER — LOSARTAN POTASSIUM 100 MG/1
100 TABLET ORAL DAILY
Qty: 30 TABLET | Refills: 11 | Status: SHIPPED | OUTPATIENT
Start: 2024-10-07

## 2024-10-07 RX ORDER — LEVETIRACETAM 750 MG/1
750 TABLET ORAL 2 TIMES DAILY
Qty: 60 TABLET | Refills: 3 | Status: SHIPPED | OUTPATIENT
Start: 2024-10-07

## 2024-10-07 SDOH — HEALTH STABILITY: PHYSICAL HEALTH: ON AVERAGE, HOW MANY DAYS PER WEEK DO YOU ENGAGE IN MODERATE TO STRENUOUS EXERCISE (LIKE A BRISK WALK)?: 7 DAYS

## 2024-10-07 SDOH — HEALTH STABILITY: PHYSICAL HEALTH: ON AVERAGE, HOW MANY MINUTES DO YOU ENGAGE IN EXERCISE AT THIS LEVEL?: 150+ MIN

## 2024-10-07 ASSESSMENT — PATIENT HEALTH QUESTIONNAIRE - PHQ9
SUM OF ALL RESPONSES TO PHQ QUESTIONS 1-9: 0
10. IF YOU CHECKED OFF ANY PROBLEMS, HOW DIFFICULT HAVE THESE PROBLEMS MADE IT FOR YOU TO DO YOUR WORK, TAKE CARE OF THINGS AT HOME, OR GET ALONG WITH OTHER PEOPLE: NOT DIFFICULT AT ALL
SUM OF ALL RESPONSES TO PHQ QUESTIONS 1-9: 0

## 2024-10-07 ASSESSMENT — SOCIAL DETERMINANTS OF HEALTH (SDOH): HOW OFTEN DO YOU GET TOGETHER WITH FRIENDS OR RELATIVES?: MORE THAN THREE TIMES A WEEK

## 2024-10-07 NOTE — PROGRESS NOTES
Preventive Care Visit  [facility]  Rema Garza MD, Family Medicine  Oct 7, 2024    Binu was seen today for physical.    Diagnoses and all orders for this visit:    Routine general medical examination at a health care facility  -     PRIMARY CARE FOLLOW-UP SCHEDULING; Future  -     REVIEW OF HEALTH MAINTENANCE PROTOCOL ORDERS  -     Comprehensive metabolic panel (BMP + Alb, Alk Phos, ALT, AST, Total. Bili, TP); Future    Screen for colon cancer  Comments:  Denies history of polyps or family history of colon cancer.  Discussed options, patient would like to do FIT test.  Orders:  -     Fecal colorectal cancer screen (FIT); Future    Essential hypertension  Comments:  Chronic, well-controlled.  Continue current management.  Orders:  -     BASIC METABOLIC PANEL; Future  -     losartan (COZAAR) 100 MG tablet; Take 1 tablet (100 mg) by mouth daily.    Hyperlipidemia, unspecified hyperlipidemia type  Comments:  Chronic, stable.  Recheck today.  Orders:  -     Lipid panel reflex to direct LDL Non-fasting; Future  -     atorvastatin (LIPITOR) 20 MG tablet; Take 1 tablet (20 mg) by mouth daily.    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  Continue current management.  Orders:  -     QUEtiapine (SEROQUEL) 25 MG tablet; TAKE 1 TABLET BY MOUTH EVERY NIGHT AT BEDTIME    Sequela of lacunar infarction  -     aspirin (ASPIRIN LOW DOSE) 81 MG EC tablet; Take 1 tablet (81 mg) by mouth daily.    Seizure disorder (H)  Comments:  Chronic, stable.  Continue current management.  Orders:  -     levETIRAcetam (KEPPRA) 750 MG tablet; Take 1 tablet (750 mg) by mouth 2 times daily.    Encounter for medication refill  -     aspirin (ASPIRIN LOW DOSE) 81 MG EC tablet; Take 1 tablet (81 mg) by mouth daily.  -     losartan (COZAAR) 100 MG tablet; Take 1 tablet (100 mg) by mouth daily.    Depression, major, in remission (H)    Vitamin  B12 deficiency (non anemic)  Comments:  Resolved.  Continue supplementation.  Orders:  -     cyanocobalamin (VITAMIN B-12) 1000 MCG tablet; Take 1 tablet (1,000 mcg) by mouth daily.    Cigarette nicotine dependence without complication  Comments:  Declined pharmacology.  Working on cessation.  Has decreased to 7 cigarettes/day.  Discussed lung cancer screening, would like to continue annual screening.  Orders:  -     CT Chest Lung Cancer Screen Low Dose Without; Future    Screening for diabetes mellitus  -     Hemoglobin A1c; Future    Need for diphtheria-tetanus-pertussis (Tdap) vaccine  -     TDAP 10-64Y (ADACEL,BOOSTRIX)    Needs flu shot  -     INFLUENZA HIGH DOSE, TRIVALENT, PF (FLUZONE)    Need for COVID-19 vaccine  -     COVID-19 12+ (PFIZER)    Need for shingles vaccine  -     ZOSTER RECOMBINANT ADJUVANTED (SHINGRIX)          Casie Schmid is a 69 year old, presenting for the following:  Physical         Health Care Directive  Patient does not have a Health Care Directive or Living Will: Discussed advance care planning with patient; information given to patient to review.    HPI        10/7/2024   General Health   How would you rate your overall physical health? Excellent            10/7/2024   Nutrition   Diet: Regular (no restrictions)    Breakfast skipped       Multiple values from one day are sorted in reverse-chronological order         10/7/2024   Exercise   Days per week of moderate/strenous exercise 7 days   Average minutes spent exercising at this level 150+ min            10/7/2024   Social Factors   Frequency of gathering with friends or relatives More than three times a week            1/31/2023   Activities of Daily Living- Home Safety   Needs help with the following daily activites NO assistance is needed   Safety concerns in the home None of the above           1/31/2023   Hearing Screening   Hearing concerns? No concerns            Today's PHQ-9 Score:       10/7/2024     1:15 PM   PHQ-9  SCORE   PHQ-9 Total Score MyChart 0   PHQ-9 Total Score 0         1/31/2023   Substance Use   Alcohol more than 3/day or more than 7/wk No        Social History     Tobacco Use    Smoking status: Every Day     Types: Cigarettes    Smokeless tobacco: Never   Vaping Use    Vaping status: Never Used       Last PSA:   Prostate Specific Antigen Screen   Date Value Ref Range Status   09/11/2019 1.0 0.0 - 4.5 ng/mL Final     ASCVD Risk   The 10-year ASCVD risk score (Martine DONNELLY, et al., 2019) is: 19.1%    Values used to calculate the score:      Age: 69 years      Sex: Male      Is Non- : No      Diabetic: No      Tobacco smoker: Yes      Systolic Blood Pressure: 132 mmHg      Is BP treated: Yes      HDL Cholesterol: 58 mg/dL      Total Cholesterol: 162 mg/dL      Reviewed and updated as needed this visit by Provider                      Current providers sharing in care for this patient include:  Patient Care Team:  Rema Garza MD as PCP - General (Family Medicine)  Rema Garza MD as Assigned PCP    The following health maintenance items are reviewed in Epic and correct as of today:  Health Maintenance   Topic Date Due    DEPRESSION ACTION PLAN  Never done    COLORECTAL CANCER SCREENING  Never done    DTAP/TDAP/TD IMMUNIZATION (4 - Td or Tdap) 09/04/2023    LUNG CANCER SCREENING  01/11/2024    NICOTINE/TOBACCO CESSATION COUNSELING Q 1 YR  01/31/2024    BMP  01/31/2024    LIPID  01/31/2024    INFLUENZA VACCINE (1) 09/01/2024    COVID-19 Vaccine (6 - 2024-25 season) 09/01/2024    ZOSTER IMMUNIZATION (2 of 2) 11/06/2023    PHQ-9  04/07/2025    MEDICARE ANNUAL WELLNESS VISIT  10/07/2025    ANNUAL REVIEW OF HM ORDERS  10/07/2025    FALL RISK ASSESSMENT  10/07/2025    GLUCOSE  01/31/2026    ADVANCE CARE PLANNING  10/07/2029    RSV VACCINE (1 - 1-dose 75+ series) 06/11/2032    HEPATITIS C SCREENING  Completed    Pneumococcal Vaccine: 65+ Years  Completed    HEPATITIS B IMMUNIZATION  Completed     "AORTIC ANEURYSM SCREENING (SYSTEM ASSIGNED)  Completed    HPV IMMUNIZATION  Aged Out    MENINGITIS IMMUNIZATION  Aged Out    RSV MONOCLONAL ANTIBODY  Aged Out            Objective    Exam  /72 (BP Location: Left arm, Patient Position: Sitting, Cuff Size: Adult Regular)   Pulse 59   Temp 97.5  F (36.4  C) (Temporal)   Resp 16   Ht 1.72 m (5' 7.72\")   Wt 90.3 kg (199 lb)   SpO2 99%   BMI 30.51 kg/m     Estimated body mass index is 30.51 kg/m  as calculated from the following:    Height as of this encounter: 1.72 m (5' 7.72\").    Weight as of this encounter: 90.3 kg (199 lb).    Physical Exam  Binu was seen today for physical.    Diagnoses and all orders for this visit:    Routine general medical examination at a health care facility  -     PRIMARY CARE FOLLOW-UP SCHEDULING; Future  -     REVIEW OF HEALTH MAINTENANCE PROTOCOL ORDERS  -     Comprehensive metabolic panel (BMP + Alb, Alk Phos, ALT, AST, Total. Bili, TP); Future    Screen for colon cancer  Comments:  Denies history of polyps or family history of colon cancer.  Discussed options, patient would like to do FIT test.  Orders:  -     Fecal colorectal cancer screen (FIT); Future    Essential hypertension  Comments:  Chronic, well-controlled.  Continue current management.  Orders:  -     BASIC METABOLIC PANEL; Future  -     losartan (COZAAR) 100 MG tablet; Take 1 tablet (100 mg) by mouth daily.    Hyperlipidemia, unspecified hyperlipidemia type  Comments:  Chronic, stable.  Recheck today.  Orders:  -     Lipid panel reflex to direct LDL Non-fasting; Future    Hyperlipidemia LDL goal <100  -     atorvastatin (LIPITOR) 20 MG tablet; Take 1 tablet (20 mg) by mouth daily.    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  Continue current management.  Orders:  -     QUEtiapine (SEROQUEL) 25 MG tablet; TAKE 1 TABLET BY MOUTH EVERY NIGHT AT BEDTIME    Sequela of lacunar " infarction  -     aspirin (ASPIRIN LOW DOSE) 81 MG EC tablet; Take 1 tablet (81 mg) by mouth daily.    Seizure disorder (H)  Comments:  Chronic, stable.  Continue current management.  Orders:  -     levETIRAcetam (KEPPRA) 750 MG tablet; Take 1 tablet (750 mg) by mouth 2 times daily.    Encounter for medication refill  -     aspirin (ASPIRIN LOW DOSE) 81 MG EC tablet; Take 1 tablet (81 mg) by mouth daily.  -     losartan (COZAAR) 100 MG tablet; Take 1 tablet (100 mg) by mouth daily.    Depression, major, in remission (H)    Vitamin B12 deficiency (non anemic)  Comments:  Resolved.  Continue supplementation.  Orders:  -     cyanocobalamin (VITAMIN B-12) 1000 MCG tablet; Take 1 tablet (1,000 mcg) by mouth daily.    Cigarette nicotine dependence without complication  Comments:  Declined pharmacology.  Working on cessation.  Has decreased to 7 cigarettes/day.  Discussed lung cancer screening, would like to continue annual screening.  Orders:  -     CT Chest Lung Cancer Screen Low Dose Without; Future    Screening for diabetes mellitus  -     Hemoglobin A1c; Future    Need for diphtheria-tetanus-pertussis (Tdap) vaccine  -     TDAP 10-64Y (ADACEL,BOOSTRIX)    Needs flu shot  -     INFLUENZA HIGH DOSE, TRIVALENT, PF (FLUZONE)    Need for COVID-19 vaccine  -     COVID-19 12+ (PFIZER)    Need for shingles vaccine  -     ZOSTER RECOMBINANT ADJUVANTED (SHINGRIX)               10/7/2024   Mini Cog   Clock Draw Score 2 Normal   3 Item Recall 3 objects recalled   Mini Cog Total Score 5                Signed Electronically by: Rema Garza MD    Prior to immunization administration, verified patients identity using patient s name and date of birth. Please see Immunization Activity for additional information.     Screening Questionnaire for Adult Immunization    Are you sick today?   No   Do you have allergies to medications, food, a vaccine component or latex?   No   Have you ever had a serious reaction after receiving a vaccination?    No   Do you have a long-term health problem with heart, lung, kidney, or metabolic disease (e.g., diabetes), asthma, a blood disorder, no spleen, complement component deficiency, a cochlear implant, or a spinal fluid leak?  Are you on long-term aspirin therapy?   No   Do you have cancer, leukemia, HIV/AIDS, or any other immune system problem?   No   Do you have a parent, brother, or sister with an immune system problem?   No   In the past 3 months, have you taken medications that affect  your immune system, such as prednisone, other steroids, or anticancer drugs; drugs for the treatment of rheumatoid arthritis, Crohn s disease, or psoriasis; or have you had radiation treatments?   No   Have you had a seizure, or a brain or other nervous system problem?   No   During the past year, have you received a transfusion of blood or blood    products, or been given immune (gamma) globulin or antiviral drug?   No   For women: Are you pregnant or is there a chance you could become       pregnant during the next month?   No   Have you received any vaccinations in the past 4 weeks?   No     Immunization questionnaire answers were all negative.      Patient instructed to remain in clinic for 15 minutes afterwards, and to report any adverse reactions.     Screening performed by Jeremy Todd MA on 10/7/2024 at 1:31 PM.

## 2024-10-07 NOTE — PATIENT INSTRUCTIONS
Patient Education   Preventive Care Advice   This is general advice given by our system to help you stay healthy. However, your care team may have specific advice just for you. Please talk to your care team about your preventive care needs.  Nutrition  Eat 5 or more servings of fruits and vegetables each day.  Try wheat bread, brown rice and whole grain pasta (instead of white bread, rice, and pasta).  Get enough calcium and vitamin D. Check the label on foods and aim for 100% of the RDA (recommended daily allowance).  Lifestyle  Exercise at least 150 minutes each week  (30 minutes a day, 5 days a week).  Do muscle strengthening activities 2 days a week. These help control your weight and prevent disease.  No smoking.  Wear sunscreen to prevent skin cancer.  Have a dental exam and cleaning every 6 months.  Yearly exams  See your health care team every year to talk about:  Any changes in your health.  Any medicines your care team has prescribed.  Preventive care, family planning, and ways to prevent chronic diseases.  Shots (vaccines)   HPV shots (up to age 26), if you've never had them before.  Hepatitis B shots (up to age 59), if you've never had them before.  COVID-19 shot: Get this shot when it's due.  Flu shot: Get a flu shot every year.  Tetanus shot: Get a tetanus shot every 10 years.  Pneumococcal, hepatitis A, and RSV shots: Ask your care team if you need these based on your risk.  Shingles shot (for age 50 and up)  General health tests  Diabetes screening:  Starting at age 35, Get screened for diabetes at least every 3 years.  If you are younger than age 35, ask your care team if you should be screened for diabetes.  Cholesterol test: At age 39, start having a cholesterol test every 5 years, or more often if advised.  Bone density scan (DEXA): At age 50, ask your care team if you should have this scan for osteoporosis (brittle bones).  Hepatitis C: Get tested at least once in your life.  STIs (sexually  transmitted infections)  Before age 24: Ask your care team if you should be screened for STIs.  After age 24: Get screened for STIs if you're at risk. You are at risk for STIs (including HIV) if:  You are sexually active with more than one person.  You don't use condoms every time.  You or a partner was diagnosed with a sexually transmitted infection.  If you are at risk for HIV, ask about PrEP medicine to prevent HIV.  Get tested for HIV at least once in your life, whether you are at risk for HIV or not.  Cancer screening tests  Cervical cancer screening: If you have a cervix, begin getting regular cervical cancer screening tests starting at age 21.  Breast cancer scan (mammogram): If you've ever had breasts, begin having regular mammograms starting at age 40. This is a scan to check for breast cancer.  Colon cancer screening: It is important to start screening for colon cancer at age 45.  Have a colonoscopy test every 10 years (or more often if you're at risk) Or, ask your provider about stool tests like a FIT test every year or Cologuard test every 3 years.  To learn more about your testing options, visit:   .  For help making a decision, visit:   https://bit.ly/ro94297.  Prostate cancer screening test: If you have a prostate, ask your care team if a prostate cancer screening test (PSA) at age 55 is right for you.  Lung cancer screening: If you are a current or former smoker ages 50 to 80, ask your care team if ongoing lung cancer screenings are right for you.  For informational purposes only. Not to replace the advice of your health care provider. Copyright   2023 [employer]. All rights reserved. Clinically reviewed by the [facility] Transitions Program. [employer] [id number] - REV 01/24.

## 2024-10-08 LAB
ALBUMIN SERPL BCG-MCNC: 4.2 G/DL (ref 3.5–5.2)
ALP SERPL-CCNC: 70 U/L (ref 40–150)
ALT SERPL W P-5'-P-CCNC: 11 U/L (ref 0–70)
ANION GAP SERPL CALCULATED.3IONS-SCNC: 9 MMOL/L (ref 7–15)
AST SERPL W P-5'-P-CCNC: 27 U/L (ref 0–45)
BILIRUB SERPL-MCNC: 0.5 MG/DL
BUN SERPL-MCNC: 12.7 MG/DL (ref 8–23)
CALCIUM SERPL-MCNC: 9.3 MG/DL (ref 8.8–10.4)
CHLORIDE SERPL-SCNC: 100 MMOL/L (ref 98–107)
CHOLEST SERPL-MCNC: 112 MG/DL
CREAT SERPL-MCNC: 0.82 MG/DL (ref 0.67–1.17)
EGFRCR SERPLBLD CKD-EPI 2021: >90 ML/MIN/1.73M2
FASTING STATUS PATIENT QL REPORTED: NO
FASTING STATUS PATIENT QL REPORTED: NO
GLUCOSE SERPL-MCNC: 89 MG/DL (ref 70–99)
HCO3 SERPL-SCNC: 24 MMOL/L (ref 22–29)
HDLC SERPL-MCNC: 53 MG/DL
LDLC SERPL CALC-MCNC: 46 MG/DL
NONHDLC SERPL-MCNC: 59 MG/DL
POTASSIUM SERPL-SCNC: 4.5 MMOL/L (ref 3.4–5.3)
PROT SERPL-MCNC: 7.8 G/DL (ref 6.4–8.3)
SODIUM SERPL-SCNC: 133 MMOL/L (ref 135–145)
TRIGL SERPL-MCNC: 64 MG/DL

## 2024-10-22 ENCOUNTER — TELEPHONE (OUTPATIENT)
Dept: FAMILY MEDICINE | Facility: CLINIC | Age: 69
End: 2024-10-22

## 2024-10-22 NOTE — TELEPHONE ENCOUNTER
----- Message from Rema Garza sent at 10/21/2024  2:17 PM CDT -----  Your sodium level is a little low.  I recommend rechecking this.  If still persistent, we may need to do more labs.  I have already ordered this lab, you just need to make a lab appointment to complete this.    Cholesterol levels are good.  Screening for diabetes was normal.

## 2024-10-22 NOTE — TELEPHONE ENCOUNTER
Relay message.     Pt has appt with CT and will see if he can get his blood drawn at [facility]

## 2024-10-23 ENCOUNTER — HOSPITAL ENCOUNTER (OUTPATIENT)
Dept: CT IMAGING | Facility: HOSPITAL | Age: 69
Discharge: HOME | End: 2024-10-23
Attending: STUDENT IN AN ORGANIZED HEALTH CARE EDUCATION/TRAINING PROGRAM | Admitting: STUDENT IN AN ORGANIZED HEALTH CARE EDUCATION/TRAINING PROGRAM

## 2024-10-23 DIAGNOSIS — F17.210 CIGARETTE NICOTINE DEPENDENCE WITHOUT COMPLICATION: ICD-10-CM

## 2024-10-23 PROCEDURE — 71271 CT THORAX LUNG CANCER SCR C-: CPT

## 2025-02-12 DIAGNOSIS — G40.909 SEIZURE DISORDER (H): ICD-10-CM

## 2025-02-13 RX ORDER — LEVETIRACETAM 750 MG/1
750 TABLET ORAL 2 TIMES DAILY
Qty: 180 TABLET | Refills: 3 | Status: SHIPPED | OUTPATIENT
Start: 2025-02-13